# Patient Record
Sex: FEMALE | Race: WHITE | NOT HISPANIC OR LATINO | Employment: OTHER | URBAN - NONMETROPOLITAN AREA
[De-identification: names, ages, dates, MRNs, and addresses within clinical notes are randomized per-mention and may not be internally consistent; named-entity substitution may affect disease eponyms.]

---

## 2017-03-22 ENCOUNTER — OFFICE VISIT (OUTPATIENT)
Dept: CARDIOLOGY | Facility: CLINIC | Age: 72
End: 2017-03-22

## 2017-03-22 VITALS
DIASTOLIC BLOOD PRESSURE: 73 MMHG | OXYGEN SATURATION: 98 % | HEART RATE: 85 BPM | HEIGHT: 64 IN | SYSTOLIC BLOOD PRESSURE: 123 MMHG | WEIGHT: 146 LBS | BODY MASS INDEX: 24.92 KG/M2

## 2017-03-22 DIAGNOSIS — R07.89 OTHER CHEST PAIN: Primary | ICD-10-CM

## 2017-03-22 DIAGNOSIS — R06.02 SHORTNESS OF BREATH: ICD-10-CM

## 2017-03-22 DIAGNOSIS — R42 DIZZINESS: ICD-10-CM

## 2017-03-22 DIAGNOSIS — R00.2 PALPITATIONS: ICD-10-CM

## 2017-03-22 DIAGNOSIS — R53.82 CHRONIC FATIGUE: ICD-10-CM

## 2017-03-22 DIAGNOSIS — M79.10 MYALGIA: ICD-10-CM

## 2017-03-22 PROBLEM — R07.9 CHEST PAIN: Status: ACTIVE | Noted: 2017-03-22

## 2017-03-22 PROCEDURE — 99204 OFFICE O/P NEW MOD 45 MIN: CPT | Performed by: INTERNAL MEDICINE

## 2017-03-22 PROCEDURE — 93000 ELECTROCARDIOGRAM COMPLETE: CPT | Performed by: INTERNAL MEDICINE

## 2017-03-22 RX ORDER — METHOCARBAMOL 750 MG/1
TABLET ORAL WEEKLY
COMMUNITY
Start: 2017-03-01 | End: 2019-09-25

## 2017-03-22 RX ORDER — BETAMETHASONE DIPROPIONATE 0.5 MG/G
LOTION TOPICAL 2 TIMES DAILY
COMMUNITY
Start: 2017-03-19 | End: 2022-03-10

## 2017-03-22 RX ORDER — BIOTIN 10 MG
TABLET ORAL DAILY
COMMUNITY
End: 2019-09-25

## 2017-03-22 RX ORDER — FINASTERIDE 5 MG/1
5 TABLET, FILM COATED ORAL 2 TIMES DAILY
COMMUNITY
End: 2019-09-25

## 2017-03-22 RX ORDER — TRIAMCINOLONE ACETONIDE 1 MG/G
CREAM TOPICAL 2 TIMES DAILY
COMMUNITY
Start: 2017-03-09 | End: 2022-03-10

## 2017-03-22 RX ORDER — MONTELUKAST SODIUM 10 MG/1
TABLET ORAL DAILY
COMMUNITY
Start: 2017-03-09 | End: 2019-09-25

## 2017-04-10 ENCOUNTER — APPOINTMENT (OUTPATIENT)
Dept: CARDIOLOGY | Facility: HOSPITAL | Age: 72
End: 2017-04-10
Attending: INTERNAL MEDICINE

## 2017-04-20 ENCOUNTER — HOSPITAL ENCOUNTER (OUTPATIENT)
Dept: CARDIOLOGY | Facility: HOSPITAL | Age: 72
Discharge: HOME | End: 2017-04-20
Attending: INTERNAL MEDICINE

## 2017-04-20 ENCOUNTER — OUTSIDE FACILITY SERVICE (OUTPATIENT)
Dept: CARDIOLOGY | Facility: CLINIC | Age: 72
End: 2017-04-20

## 2017-04-20 LAB
MAXIMAL PREDICTED HEART RATE: 150 BPM
STRESS TARGET HR: 128 BPM

## 2017-04-20 PROCEDURE — 0 TECHNETIUM SESTAMIBI: Performed by: INTERNAL MEDICINE

## 2017-04-20 PROCEDURE — 78452 HT MUSCLE IMAGE SPECT MULT: CPT | Performed by: INTERNAL MEDICINE

## 2017-04-20 PROCEDURE — A9500 TC99M SESTAMIBI: HCPCS | Performed by: INTERNAL MEDICINE

## 2017-04-20 PROCEDURE — 93017 CV STRESS TEST TRACING ONLY: CPT

## 2017-04-20 PROCEDURE — 78452 HT MUSCLE IMAGE SPECT MULT: CPT

## 2017-04-20 PROCEDURE — 93018 CV STRESS TEST I&R ONLY: CPT | Performed by: INTERNAL MEDICINE

## 2017-04-20 RX ADMIN — Medication 1 DOSE: at 14:30

## 2019-09-25 ENCOUNTER — OFFICE VISIT (OUTPATIENT)
Dept: CARDIOLOGY | Facility: CLINIC | Age: 74
End: 2019-09-25

## 2019-09-25 VITALS
WEIGHT: 146 LBS | BODY MASS INDEX: 24.92 KG/M2 | OXYGEN SATURATION: 97 % | SYSTOLIC BLOOD PRESSURE: 132 MMHG | HEIGHT: 64 IN | HEART RATE: 71 BPM | DIASTOLIC BLOOD PRESSURE: 69 MMHG

## 2019-09-25 DIAGNOSIS — R07.2 PRECORDIAL PAIN: ICD-10-CM

## 2019-09-25 DIAGNOSIS — R00.2 PALPITATIONS: Primary | ICD-10-CM

## 2019-09-25 DIAGNOSIS — R06.02 SHORTNESS OF BREATH: ICD-10-CM

## 2019-09-25 DIAGNOSIS — R53.82 CHRONIC FATIGUE: ICD-10-CM

## 2019-09-25 DIAGNOSIS — R42 DIZZINESS: ICD-10-CM

## 2019-09-25 PROCEDURE — 99214 OFFICE O/P EST MOD 30 MIN: CPT | Performed by: INTERNAL MEDICINE

## 2019-09-25 RX ORDER — ATENOLOL 25 MG/1
25 TABLET ORAL DAILY
Qty: 30 TABLET | Refills: 11 | Status: SHIPPED | OUTPATIENT
Start: 2019-09-25 | End: 2019-12-02

## 2019-09-25 NOTE — PROGRESS NOTES
"Subjective   Racquel Cheema is a 73 y.o. female     Chief Complaint   Patient presents with   • Palpitations     Here for a follow up    • Shortness of Breath       PROBLEM LIST:     1. Hyperlipidemia  2. Chest Pain  3. Shortness of breath  4. Fatigue  5. Dizziness  6. Palpitations  7. Lung CA  8. Borderline Diabetes  9. GERD  10. IBS  11. Diverticulosis    HPI: Patient is a 73-year-old female patient that recently underwent cataract surgery and presented to the office due to a fuzzy and thick feeling in the back of her head.  This occurred while she was in preop prior to any medications and was identified to be in a sinus bradycardia at 46 with a single PVC and a blood pressure of 142/57.  Ports that these episodes are occurring more frequently than usual.  Also episodes are brought on by deep breathing and holding her breath during assessment.  Additional complaints include fatigue with decreased exercise intolerance due to difficulty in breathing.  \"Feels like it is hard to take a deep breath \".  Patient also reports having chest pains approximately 4-5 times a week over the past few months the chest pain is periodically gotten more frequent over the year and is unable to describe other than pain no aggravating or relieving factors were available to be identified.  Other associated factors include nausea and diaphoresis and palpitations.  So shaded symptoms did not occur at the same time his chest pain.  Patient reports waking up at night and also during the day and having episodes of severe diaphoresis and severe nausea.  Ports palpitations that feel like her heart is Jell-O.  Patient denies any PND, daryl orthopnea, lower extremity edema.  Additional symptom that is reported as nighttime sweats that are not associated with chest pain.                                                       Specialty Problems        Cardiology Problems    Palpitations                                        CURRENT " "MEDICATION:    Current Outpatient Medications   Medication Sig Dispense Refill   • Ascorbic Acid (VITAMIN C PO) Take 1,000 mg by mouth Daily.     • betamethasone dipropionate (DIPROLENE) 0.05 % lotion 2 (Two) Times a Day.     • Esomeprazole Magnesium (NEXIUM PO) Take  by mouth Daily As Needed.     • LUTEIN-ZEAXANTHIN PO Take  by mouth Daily.     • triamcinolone (KENALOG) 0.1 % cream 2 (Two) Times a Day.       No current facility-administered medications for this visit.        ALLERGIES:    Demerol [meperidine]    PAST MEDICAL HISTORY:    Past Medical History:   Diagnosis Date   • Borderline diabetes    • Cancer (CMS/HCC)     lung cancer   • Diverticulosis    • Dizziness    • Eczema    • GERD (gastroesophageal reflux disease)    • Hiatal hernia    • Hyperlipidemia    • IBS (irritable bowel syndrome)    • Rosacea        SURGICAL HISTORY:    Past Surgical History:   Procedure Laterality Date   • BILATERAL BREAST REDUCTION     • BUNIONECTOMY     • HYSTERECTOMY     • LUNG LOBECTOMY     • TONSILLECTOMY         SOCIAL HISTORY:    Social History     Socioeconomic History   • Marital status:      Spouse name: Not on file   • Number of children: Not on file   • Years of education: Not on file   • Highest education level: Not on file   Tobacco Use   • Smoking status: Never Smoker   • Smokeless tobacco: Never Used   Substance and Sexual Activity   • Alcohol use: No   • Drug use: No       FAMILY HISTORY:    Family History   Problem Relation Age of Onset   • Heart attack Mother    • Alzheimer's disease Mother    • Heart attack Father    • Heart attack Brother        Review of Systems   Constitutional: Positive for activity change (Decrease exercise intolerance ) and fatigue (no energy).   HENT: Negative.    Eyes: Negative.    Respiratory: Positive for shortness of breath (\"Hard to get a breath\").         Denies orthopnea/PND   Cardiovascular: Positive for chest pain (sharp, no precipitating factors. denies shortness of " "breath) and palpitations (Feels like my heart is Jello). Negative for leg swelling.   Gastrointestinal: Negative.    Endocrine: Negative.    Genitourinary: Negative.    Musculoskeletal: Positive for arthralgias and myalgias.   Skin: Negative.    Allergic/Immunologic: Negative.    Neurological: Positive for dizziness (Fuzzy thick feeling in the back of the head). Negative for syncope.   Hematological: Negative.    Psychiatric/Behavioral: Negative.        VISIT VITALS:  Vitals:    09/25/19 1021 09/25/19 1118   BP: 119/75 132/69   BP Location: Left arm Left arm   Patient Position: Sitting Sitting   Pulse: 71    SpO2: 97%    Weight: 66.2 kg (146 lb)    Height: 162.6 cm (64\")       /69 (BP Location: Left arm, Patient Position: Sitting)   Pulse 71   Ht 162.6 cm (64\")   Wt 66.2 kg (146 lb)   SpO2 97%   BMI 25.06 kg/m²     RECENT LABS:    Objective       Physical Exam   Constitutional: She is oriented to person, place, and time. Vital signs are normal. She appears well-developed and well-nourished. No distress.   HENT:   Head: Normocephalic and atraumatic.   Eyes: Conjunctivae, EOM and lids are normal. Pupils are equal, round, and reactive to light.   Neck: Trachea normal and normal range of motion. Neck supple. No JVD present. Carotid bruit is not present. No tracheal deviation present.   Nl. Carotid upstrokes     Cardiovascular: Normal rate, regular rhythm, normal heart sounds and intact distal pulses.   Pulses:       Dorsalis pedis pulses are 2+ on the right side, and 2+ on the left side.        Posterior tibial pulses are 2+ on the right side, and 2+ on the left side.   Pulmonary/Chest: Effort normal and breath sounds normal. No respiratory distress. She has no wheezes. She has no rhonchi. She has no rales.   Nl. Expir. Phase  Nl. Breath sound intensity     Abdominal: Soft. Normal appearance and bowel sounds are normal. She exhibits no distension, no abdominal bruit and no mass. There is no tenderness. There " is no rebound and no guarding.   No organomegaly   Musculoskeletal: Normal range of motion. She exhibits no edema, tenderness or deformity.   Neurological: She is alert and oriented to person, place, and time. She has normal strength. No cranial nerve deficit.   Skin: Skin is warm, dry and intact. No rash noted. No erythema. No pallor.   Psychiatric: She has a normal mood and affect. Her speech is normal and behavior is normal. Judgment and thought content normal. Cognition and memory are normal.   Nursing note and vitals reviewed.      Procedures      Assessment/Plan      Diagnosis Plan   1. Palpitations     2. Shortness of breath     3. Precordial pain     4. Myalgia     5. Chronic fatigue     6. Dizziness         No Follow-up on file.     1 palpitations: Patient will be ordered an event monitor to help evaluate for particular dysrhythmias and help determine the pathology of the palpitations and dizziness.  2 precordial pain/shortness of breath/chronic fatigue/dizziness will be evaluated by transesophageal echocardiogram, stress test with myocardial perfusion, a cardiac event monitor.  Patient informed to call office or go to the emergency room for any worsening symptoms or any new symptoms that are concerning.  3.  Patiently currently on atenolol 25 mg daily and will continue therapy until further testing is resulted.  No other change in medication regimen will be made at this time.    Patient will follow-up with Dr. Donald her testing is completed or sooner if new or exacerbation of symptoms.         Patient's Body mass index is 25.06 kg/m². BMI is within normal parameters. No follow-up required..  RODNEY Armenta seen patient with Dr. Donald and served as a scribe    Jane Hargrove LPN    Scribed for Dr. Timmy Donald by Jane Hargrove LPN September 25, 2019 11:27 AM         Electronically signed by:            This note is dictated utilizing voice recognition software.  Although this record has been  proof read, transcriptional errors may still be present. If questions occur regarding the content of this record please do not hesitate to call our office.

## 2019-10-14 ENCOUNTER — HOSPITAL ENCOUNTER (OUTPATIENT)
Dept: CARDIOLOGY | Facility: HOSPITAL | Age: 74
Discharge: HOME | End: 2019-10-14

## 2019-10-14 DIAGNOSIS — R06.02 SHORTNESS OF BREATH: ICD-10-CM

## 2019-10-14 DIAGNOSIS — R00.2 PALPITATIONS: ICD-10-CM

## 2019-10-14 DIAGNOSIS — R53.82 CHRONIC FATIGUE: ICD-10-CM

## 2019-10-14 DIAGNOSIS — R07.2 PRECORDIAL PAIN: ICD-10-CM

## 2019-10-14 DIAGNOSIS — R42 DIZZINESS: ICD-10-CM

## 2019-10-14 PROCEDURE — 93306 TTE W/DOPPLER COMPLETE: CPT | Performed by: INTERNAL MEDICINE

## 2019-10-14 PROCEDURE — 93306 TTE W/DOPPLER COMPLETE: CPT

## 2019-10-14 PROCEDURE — 78452 HT MUSCLE IMAGE SPECT MULT: CPT | Performed by: INTERNAL MEDICINE

## 2019-10-14 PROCEDURE — 25010000002 REGADENOSON 0.4 MG/5ML SOLUTION: Performed by: INTERNAL MEDICINE

## 2019-10-14 PROCEDURE — 0 TECHNETIUM SESTAMIBI: Performed by: INTERNAL MEDICINE

## 2019-10-14 PROCEDURE — A9500 TC99M SESTAMIBI: HCPCS | Performed by: INTERNAL MEDICINE

## 2019-10-14 PROCEDURE — 93018 CV STRESS TEST I&R ONLY: CPT | Performed by: INTERNAL MEDICINE

## 2019-10-14 PROCEDURE — 78452 HT MUSCLE IMAGE SPECT MULT: CPT

## 2019-10-14 PROCEDURE — 93017 CV STRESS TEST TRACING ONLY: CPT

## 2019-10-14 RX ADMIN — TECHNETIUM TC 99M SESTAMIBI 1 DOSE: 1 INJECTION INTRAVENOUS at 08:22

## 2019-10-14 RX ADMIN — REGADENOSON 0.4 MG: 0.08 INJECTION, SOLUTION INTRAVENOUS at 08:22

## 2019-10-17 LAB
BH CV STRESS COMMENTS STAGE 1: NORMAL
BH CV STRESS DOSE REGADENOSON STAGE 1: 0.4
BH CV STRESS DURATION MIN STAGE 1: 0
BH CV STRESS DURATION SEC STAGE 1: 10
BH CV STRESS PROTOCOL 1: NORMAL
BH CV STRESS RECOVERY BP: NORMAL MMHG
BH CV STRESS RECOVERY HR: 86 BPM
BH CV STRESS STAGE 1: 1
MAXIMAL PREDICTED HEART RATE: 148 BPM
PERCENT MAX PREDICTED HR: 69.59 %
STRESS BASELINE BP: NORMAL MMHG
STRESS BASELINE HR: 61 BPM
STRESS PERCENT HR: 82 %
STRESS POST PEAK BP: NORMAL MMHG
STRESS POST PEAK HR: 103 BPM
STRESS TARGET HR: 126 BPM

## 2019-10-21 ENCOUNTER — TELEPHONE (OUTPATIENT)
Dept: CARDIOLOGY | Facility: CLINIC | Age: 74
End: 2019-10-21

## 2019-10-21 NOTE — TELEPHONE ENCOUNTER
No answer at home number. L/M would try contacting her again on Monday. 10-18-19 12:34 am. PH,LPN    SPOKE WITH ROSALINA HIS AM, AWARE STRESS TEST NEG. FOR ISCHEMIA AND KEEP F/U APPT. ON 12-2-19. ELYSSA SAUCEDO        ----- Message from Timmy Donald MD sent at 10/18/2019 12:28 PM EDT -----  Routine f/u.

## 2019-10-23 ENCOUNTER — TELEPHONE (OUTPATIENT)
Dept: CARDIOLOGY | Facility: CLINIC | Age: 74
End: 2019-10-23

## 2019-10-23 LAB
BH CV ECHO MEAS - ACS: 1.9 CM
BH CV ECHO MEAS - AO MAX PG: 6 MMHG
BH CV ECHO MEAS - AO MEAN PG: 4 MMHG
BH CV ECHO MEAS - AO ROOT AREA (BSA CORRECTED): 2
BH CV ECHO MEAS - AO ROOT AREA: 9.3 CM^2
BH CV ECHO MEAS - AO ROOT DIAM: 3.5 CM
BH CV ECHO MEAS - AO V2 MAX: 122 CM/SEC
BH CV ECHO MEAS - AO V2 MEAN: 89.6 CM/SEC
BH CV ECHO MEAS - AO V2 VTI: 32.8 CM
BH CV ECHO MEAS - BSA(HAYCOCK): 1.7 M^2
BH CV ECHO MEAS - BSA: 1.7 M^2
BH CV ECHO MEAS - BZI_BMI: 25.1 KILOGRAMS/M^2
BH CV ECHO MEAS - BZI_METRIC_HEIGHT: 162.6 CM
BH CV ECHO MEAS - BZI_METRIC_WEIGHT: 66.2 KG
BH CV ECHO MEAS - EDV(CUBED): 35.9 ML
BH CV ECHO MEAS - EDV(MOD-SP4): 67.3 ML
BH CV ECHO MEAS - EDV(TEICH): 44.1 ML
BH CV ECHO MEAS - EF(CUBED): 72.7 %
BH CV ECHO MEAS - EF(MOD-SP4): 60.9 %
BH CV ECHO MEAS - EF(TEICH): 65.8 %
BH CV ECHO MEAS - ESV(CUBED): 9.8 ML
BH CV ECHO MEAS - ESV(MOD-SP4): 26.3 ML
BH CV ECHO MEAS - ESV(TEICH): 15.1 ML
BH CV ECHO MEAS - FS: 35.2 %
BH CV ECHO MEAS - IVS/LVPW: 0.94
BH CV ECHO MEAS - IVSD: 0.95 CM
BH CV ECHO MEAS - LA DIMENSION: 2.8 CM
BH CV ECHO MEAS - LA/AO: 0.81
BH CV ECHO MEAS - LV DIASTOLIC VOL/BSA (35-75): 39.3 ML/M^2
BH CV ECHO MEAS - LV IVRT: 0.09 SEC
BH CV ECHO MEAS - LV MASS(C)D: 91.6 GRAMS
BH CV ECHO MEAS - LV MASS(C)DI: 53.6 GRAMS/M^2
BH CV ECHO MEAS - LV SYSTOLIC VOL/BSA (12-30): 15.4 ML/M^2
BH CV ECHO MEAS - LVIDD: 3.3 CM
BH CV ECHO MEAS - LVIDS: 2.1 CM
BH CV ECHO MEAS - LVLD AP4: 6.8 CM
BH CV ECHO MEAS - LVLS AP4: 5.8 CM
BH CV ECHO MEAS - LVOT AREA (M): 3.5 CM^2
BH CV ECHO MEAS - LVOT AREA: 3.5 CM^2
BH CV ECHO MEAS - LVOT DIAM: 2.1 CM
BH CV ECHO MEAS - LVPWD: 1 CM
BH CV ECHO MEAS - MV A MAX VEL: 94.3 CM/SEC
BH CV ECHO MEAS - MV DEC SLOPE: 194 CM/SEC^2
BH CV ECHO MEAS - MV E MAX VEL: 63.7 CM/SEC
BH CV ECHO MEAS - MV E/A: 0.68
BH CV ECHO MEAS - RVDD: 2.7 CM
BH CV ECHO MEAS - SI(AO): 179.2 ML/M^2
BH CV ECHO MEAS - SI(CUBED): 15.3 ML/M^2
BH CV ECHO MEAS - SI(MOD-SP4): 24 ML/M^2
BH CV ECHO MEAS - SI(TEICH): 17 ML/M^2
BH CV ECHO MEAS - SV(AO): 306.6 ML
BH CV ECHO MEAS - SV(CUBED): 26.1 ML
BH CV ECHO MEAS - SV(MOD-SP4): 41 ML
BH CV ECHO MEAS - SV(TEICH): 29 ML
MAXIMAL PREDICTED HEART RATE: 148 BPM
STRESS TARGET HR: 126 BPM

## 2019-10-23 NOTE — TELEPHONE ENCOUNTER
ECHO RESULTS BRIEFLY DISCUSSED WITH PATIENT AND TO KEEP F/U APPT. SCHEDULED. PH,LPN          ----- Message from Timmy Donald MD sent at 10/23/2019  3:25 PM EDT -----  Routine f/u.

## 2019-10-24 ENCOUNTER — OUTSIDE FACILITY SERVICE (OUTPATIENT)
Dept: CARDIOLOGY | Facility: CLINIC | Age: 74
End: 2019-10-24

## 2019-10-24 PROCEDURE — 93228 REMOTE 30 DAY ECG REV/REPORT: CPT | Performed by: INTERNAL MEDICINE

## 2019-11-05 ENCOUNTER — DOCUMENTATION (OUTPATIENT)
Dept: CARDIOLOGY | Facility: CLINIC | Age: 74
End: 2019-11-05

## 2019-11-05 NOTE — PROGRESS NOTES
INCORRECT PROCESS FOLLOWED WITH DOCUMENTATION ORIGINALLY COMPLETED FOR 9/25/19 VISIT.  RODNEY WALLER ACTED AS SCRIBE FOR DR. LUO.

## 2019-12-02 ENCOUNTER — OFFICE VISIT (OUTPATIENT)
Dept: CARDIOLOGY | Facility: CLINIC | Age: 74
End: 2019-12-02

## 2019-12-02 VITALS
SYSTOLIC BLOOD PRESSURE: 130 MMHG | DIASTOLIC BLOOD PRESSURE: 82 MMHG | OXYGEN SATURATION: 96 % | WEIGHT: 149 LBS | HEART RATE: 74 BPM | HEIGHT: 64 IN | BODY MASS INDEX: 25.44 KG/M2

## 2019-12-02 DIAGNOSIS — R00.2 PALPITATIONS: ICD-10-CM

## 2019-12-02 DIAGNOSIS — R53.82 CHRONIC FATIGUE: ICD-10-CM

## 2019-12-02 DIAGNOSIS — R42 DIZZINESS: ICD-10-CM

## 2019-12-02 DIAGNOSIS — R06.02 SHORTNESS OF BREATH: Primary | ICD-10-CM

## 2019-12-02 PROCEDURE — 99214 OFFICE O/P EST MOD 30 MIN: CPT | Performed by: NURSE PRACTITIONER

## 2019-12-02 RX ORDER — PANTOPRAZOLE SODIUM 40 MG/1
40 TABLET, DELAYED RELEASE ORAL DAILY
Refills: 5 | COMMUNITY
Start: 2019-10-17

## 2019-12-02 NOTE — PROGRESS NOTES
Subjective     Racquel Cheema is a 73 y.o. female who presents to day for Follow-up (Here for a follow up on testing ).    CHIEF COMPLIANT  Chief Complaint   Patient presents with   • Follow-up     Here for a follow up on testing        Active Problems:  Problem List Items Addressed This Visit        Cardiovascular and Mediastinum    Palpitations       Respiratory    Shortness of breath - Primary    Relevant Orders    XR Chest PA & Lateral    Full Pulmonary Function Test With Bronchodilator       Other    Chronic fatigue    Dizziness      PROBLEM LIST:      1. Hyperlipidemia  2. Chest Pain  2.1 9/19 negative stress test with preserved ejection fraction  2.2 9/19 echocardiogram with mild left ventricular hypertrophy and diastolic dysfunction stage I ejection fraction of 56 to 60%  3. Shortness of breath  4. Fatigue  5. Dizziness  6. Palpitations  7. Lung CA  8. Borderline Diabetes  9. GERD  10. IBS  11. Diverticulosis    HPI  HPI  Mrs. Cheema is a 73-year-old female who is being seen today for follow-up for stress and echo results.  The stress test revealed no sign to graphic evidence of ischemia, preserved post stress ejection fraction of 74% with no focal wall abnormality.  The echocardiogram identified mild concentric left ventricular hypertrophy with grade 1 diastolic dysfunction, trivial to mild MR, heavily calcified aortic valve without stenosis or insufficiency, and mild pulmonic insufficiency.  We will pericardial effusion present which is likely physiological.  Patient had 2 runs of atrial tachycardia in her event monitor over the 14-day.  Most symptoms occurred in a sinus rhythm to sinus tach.  Also reports that she has had a negative carotid, negative CT of the head, negative MRI of the neck with mild disease by her primary care provider recently.  States that the dizziness occurs at random anytime throughout the day no other symptoms or warnings are associated with it.  Patient states that she has been  having a lot of neck trouble and had mild disease that was identified on MRI but she ever since she has had a headache that do not stop and gets frequent kinks in her neck.  Does state that she is undergoing physical therapy for her neck.  Does not seem to be helping any.  Also reported seeing GI and they switched her PPI which decreased a lot of her GERD-like symptoms including her cough.  Still complains of fatigue in which she is always tired.  A decreased exercise tolerance was demonstrated by the report of unable to walk up the driveway without having to stop and rest and gasp for air.  Patient denies any chest pain, chest pressure, chest tightness, PND, orthopnea, lower extremity edema, syncope, or other neurological changes.  PRIOR MEDS  Current Outpatient Medications on File Prior to Visit   Medication Sig Dispense Refill   • Ascorbic Acid (VITAMIN C PO) Take 1,000 mg by mouth Daily.     • betamethasone dipropionate (DIPROLENE) 0.05 % lotion 2 (Two) Times a Day.     • Calcium Carbonate-Vitamin D (CALTRATE 600+D PO) Take 1 tablet by mouth Daily.     • pantoprazole (PROTONIX) 40 MG EC tablet Take 40 mg by mouth Daily. FOR 30 DAYS  5   • triamcinolone (KENALOG) 0.1 % cream 2 (Two) Times a Day.     • Esomeprazole Magnesium (NEXIUM PO) Take  by mouth Daily As Needed.     • [DISCONTINUED] atenolol (TENORMIN) 25 MG tablet Take 1 tablet by mouth Daily. 30 tablet 11   • [DISCONTINUED] LUTEIN-ZEAXANTHIN PO Take  by mouth Daily.       No current facility-administered medications on file prior to visit.        ALLERGIES  Demerol [meperidine]    HISTORY  Past Medical History:   Diagnosis Date   • Borderline diabetes    • Cancer (CMS/HCC)     lung cancer   • Diverticulosis    • Dizziness    • Eczema    • GERD (gastroesophageal reflux disease)    • Hiatal hernia    • Hyperlipidemia    • IBS (irritable bowel syndrome)    • Rosacea        Social History     Socioeconomic History   • Marital status:      Spouse name:  "Not on file   • Number of children: Not on file   • Years of education: Not on file   • Highest education level: Not on file   Tobacco Use   • Smoking status: Never Smoker   • Smokeless tobacco: Never Used   Substance and Sexual Activity   • Alcohol use: No   • Drug use: No       Family History   Problem Relation Age of Onset   • Heart attack Mother    • Alzheimer's disease Mother    • Heart attack Father    • Heart attack Brother        Review of Systems   Constitutional: Positive for fatigue (always tired ). Negative for chills and fever.   HENT: Negative.    Eyes: Negative.  Negative for visual disturbance.   Respiratory: Positive for cough (dry cough at times ) and shortness of breath (SOA with exertion , has to catch breath, can't walk up drive way). Negative for chest tightness.    Cardiovascular: Positive for palpitations (occasional flutters ). Negative for chest pain and leg swelling.   Gastrointestinal: Negative.    Endocrine: Positive for cold intolerance. Negative for heat intolerance.   Genitourinary: Negative.    Musculoskeletal: Positive for neck pain. Negative for arthralgias and back pain.   Skin: Positive for rash (chronic eczema, rosacea on her face ). Negative for wound.   Allergic/Immunologic: Negative.  Negative for environmental allergies and food allergies.   Neurological: Positive for dizziness and light-headedness (occasional ). Negative for weakness.   Hematological: Negative.  Does not bruise/bleed easily.   Psychiatric/Behavioral: Negative.  Negative for sleep disturbance (denies waking with smothering ).       Objective     VITALS: /82 (BP Location: Left arm, Patient Position: Sitting)   Pulse 74   Ht 162.6 cm (64\")   Wt 67.6 kg (149 lb)   SpO2 96%   BMI 25.58 kg/m²     LABS:   Lab Results (most recent)     None          IMAGING:   No Images in the past 120 days found..    EXAM:  Physical Exam   Constitutional: She is oriented to person, place, and time. Vital signs are " normal. She appears well-developed and well-nourished.   Eyes: EOM are normal. Pupils are equal, round, and reactive to light.   Neck: Trachea normal and phonation normal. Neck supple. No JVD present. Carotid bruit is not present. No thyroid mass present.   Cardiovascular: Normal rate, regular rhythm, normal heart sounds and intact distal pulses. Exam reveals no gallop and no friction rub.   No murmur heard.  Pulses:       Radial pulses are 2+ on the right side, and 2+ on the left side.        Posterior tibial pulses are 2+ on the right side, and 2+ on the left side.   Pulmonary/Chest: Effort normal and breath sounds normal. No respiratory distress. She has no wheezes. She has no rales.   Abdominal: Soft. Bowel sounds are normal. She exhibits no distension and no abdominal bruit. There is no tenderness.   Musculoskeletal: Normal range of motion.   Neurological: She is alert and oriented to person, place, and time. She has normal strength. No cranial nerve deficit or sensory deficit.   Skin: Skin is warm, dry and intact. Capillary refill takes less than 2 seconds. No rash noted.   Psychiatric: She has a normal mood and affect. Her speech is normal and behavior is normal. Judgment and thought content normal. Cognition and memory are normal.   Nursing note and vitals reviewed.      Procedure   Procedures       Assessment/Plan    Diagnosis Plan   1. Shortness of breath  XR Chest PA & Lateral    Full Pulmonary Function Test With Bronchodilator    Full Pulmonary Function Test With Bronchodilator    XR Chest PA & Lateral   2. Palpitations     3. Chronic fatigue     4. Dizziness     1.  Due to patient's persistent shortness of air and negative previous studies I feel is appropriate to get a chest x-ray to further evaluate pulmonary causes.  We will also order outpatient PFTs with bronchodilator therapy to check lung capacity.  Discussed referral to pulmonology but requested to discuss with Dr. Kathrine driver.  2.   Patient's palpitations are infrequent and describes as fluttering.  Discuss metoprolol therapy due to runs of atrial tachycardia on her event monitor.  Patient wishes to defer at this time and also further discussed with Dr. Leslie.  3.  Patient advised to exercise but not to overexert.  Advised may increase her conditioning and help decrease some of the shortness of air.  4.  Patient has persistent dizziness and has had negative carotid Doppler, negative CT of the head, so we will look for potential pulmonary cause.  Also advised to monitor blood pressure at times of dizziness to see if blood pressures actually low or high.  5.  Patient informed of signs and symptoms of ACS and advised to seek emergent treatment for any new or worsening symptoms.  Patient also advised to seek sooner follow-up as needed.    Return in about 4 weeks (around 12/30/2019), or if symptoms worsen or fail to improve.    Racquel was seen today for follow-up.    Diagnoses and all orders for this visit:    Shortness of breath  -     XR Chest PA & Lateral; Future  -     Full Pulmonary Function Test With Bronchodilator; Future  -     Full Pulmonary Function Test With Bronchodilator  -     XR Chest PA & Lateral    Palpitations    Chronic fatigue    Dizziness          Patient's Body mass index is 25.58 kg/m². BMI is within normal parameters. No follow-up required..           MEDS ORDERED DURING VISIT:  No orders of the defined types were placed in this encounter.          This document has been electronically signed by RODNEY Armenta Jr.  December 2, 2019 12:46 PM

## 2019-12-02 NOTE — PATIENT INSTRUCTIONS
Dizziness  Dizziness is a common problem. It makes you feel unsteady or light-headed. You may feel like you are about to pass out (faint). Dizziness can lead to getting hurt if you stumble or fall. Dizziness can be caused by many things, including:  · Medicines.  · Not having enough water in your body (dehydration).  · Illness.  Follow these instructions at home:  Eating and drinking    · Drink enough fluid to keep your pee (urine) clear or pale yellow. This helps to keep you from getting dehydrated. Try to drink more clear fluids, such as water.  · Do not drink alcohol.  · Limit how much caffeine you drink or eat, if your doctor tells you to do that.  · Limit how much salt (sodium) you drink or eat, if your doctor tells you to do that.  Activity    · Avoid making quick movements.  ? When you stand up from sitting in a chair, steady yourself until you feel okay.  ? In the morning, first sit up on the side of the bed. When you feel okay, stand slowly while you hold onto something. Do this until you know that your balance is fine.  · If you need to  one place for a long time, move your legs often. Tighten and relax the muscles in your legs while you are standing.  · Do not drive or use heavy machinery if you feel dizzy.  · Avoid bending down if you feel dizzy. Place items in your home so you can reach them easily without leaning over.  Lifestyle  · Do not use any products that contain nicotine or tobacco, such as cigarettes and e-cigarettes. If you need help quitting, ask your doctor.  · Try to lower your stress level. You can do this by using methods such as yoga or meditation. Talk with your doctor if you need help.  General instructions  · Watch your dizziness for any changes.  · Take over-the-counter and prescription medicines only as told by your doctor. Talk with your doctor if you think that you are dizzy because of a medicine that you are taking.  · Tell a friend or a family member that you are feeling  dizzy. If he or she notices any changes in your behavior, have this person call your doctor.  · Keep all follow-up visits as told by your doctor. This is important.  Contact a doctor if:  · Your dizziness does not go away.  · Your dizziness or light-headedness gets worse.  · You feel sick to your stomach (nauseous).  · You have trouble hearing.  · You have new symptoms.  · You are unsteady on your feet.  · You feel like the room is spinning.  Get help right away if:  · You throw up (vomit) or have watery poop (diarrhea), and you cannot eat or drink anything.  · You have trouble:  ? Talking.  ? Walking.  ? Swallowing.  ? Using your arms, hands, or legs.  · You feel generally weak.  · You are not thinking clearly, or you have trouble forming sentences. A friend or family member may notice this.  · You have:  ? Chest pain.  ? Pain in your belly (abdomen).  ? Shortness of breath.  ? Sweating.  · Your vision changes.  · You are bleeding.  · You have a very bad headache.  · You have neck pain or a stiff neck.  · You have a fever.  These symptoms may be an emergency. Do not wait to see if the symptoms will go away. Get medical help right away. Call your local emergency services (911 in the [place] Do not drive yourself to the hospital.  Summary  · Dizziness makes you feel unsteady or light-headed. You may feel like you are about to pass out (faint).  · Drink enough fluid to keep your pee (urine) clear or pale yellow. Do not drink alcohol.  · Avoid making quick movements if you feel dizzy.  · Watch your dizziness for any changes.  This information is not intended to replace advice given to you by your health care provider. Make sure you discuss any questions you have with your health care provider.  Document Released: 12/06/2012 Document Revised: 01/04/2018 Document Reviewed: 01/04/2018  [employer] Interactive Patient Education © 2019 [employer]  Acute Coronary Syndrome    Acute coronary syndrome (ACS) is a serious problem  in which there is suddenly not enough blood and oxygen reaching the heart. ACS can result in chest pain or a heart attack.  This condition is a medical emergency. If you have any symptoms of this condition, get help right away.  What are the causes?  This condition may be caused by:  · Buildup of fat and cholesterol inside of the arteries (atherosclerosis). This is the most common cause. The buildup (plaque) can cause blood vessels in the heart (coronary arteries) to become narrow or blocked, which reduces blood flow to the heart. Plaque can also break off and lead to a clot, which can block an artery and cause a heart attack or stroke.  · Sudden tightening of the muscles around the coronary arteries (coronary spasm).  · Tearing of a coronary artery (spontaneous coronary artery dissection).  · Very low blood pressure (hypotension).  · An abnormal heartbeat (arrhythmia).  · Other medical conditions that cause a decrease of oxygen to the heart, such as anemiaorrespiratory failure.  · Using cocaine or methamphetamine.  What increases the risk?  The following factors may make you more likely to develop this condition:  · Age. The risk for ACS increases as you get older.  · History of chest pain, heart attack, peripheral artery disease, or stroke.  · Having taken chemotherapy or immune-suppressing medicines.  · Being male.  · Family history of chest pain, heart disease, or stroke.  · Smoking.  · Not exercising enough.  · Being overweight.  · High cholesterol.  · High blood pressure (hypertension).  · Diabetes.  · Excessive alcohol use.  What are the signs or symptoms?  Common symptoms of this condition include:  · Chest pain. The pain may last a long time, or it may stop and come back (recur). It may feel like:  ? Crushing or squeezing.  ? Tightness, pressure, fullness, or heaviness.  · Arm, neck, jaw, or back pain.  · Heartburn or indigestion.  · Shortness of breath.  · Nausea.  · Sudden cold  sweats.  · Light-headedness.  · Dizziness, or passing out.  · Tiredness (fatigue).  Sometimes there are no symptoms.  How is this diagnosed?  This condition may be diagnosed based on:  · Your medical history and symptoms.  · An electrocardiogram (ECG). This imaging test measures the heart's electrical activity.  · Blood tests. Cardiac blood tests may need to be repeated at designated time intervals.  · Chest X-ray.  · A CT scan of the chest.  · A coronary angiogram. This is a procedure in which dye is injected into the bloodstream and then X-rays are taken to show if there is a blockage in a coronary artery.  · Exercise stress testing.  · Echocardiography. This is a test that uses sound waves to produce detailed images of the heart.  How is this treated?  The treatment is to restore blood flow to the heart as soon as possible. Treatment for this condition may include:  · Oxygen therapy.  · Medicines, such as:  ? Antiplatelet medicines and blood-thinning medicines, such as aspirin. These help prevent blood clots.  ? Medicine that dissolves any blood clots (fibrinolytic therapy).  ? Blood pressure medicines.  ? Nitroglycerin. This helps relieve chest pain and widens blood vessels to improve blood flow.  ? Pain medicine.  ? Cholesterol-lowering medicine.  · Surgery, such as:  ? Coronary angioplasty with stent placement. This involves placing a small piece of metal that looks like mesh or a spring into a narrow coronary artery. This widens the artery and keep it open.  ? Coronary artery bypass surgery. This involves taking a section of a blood vessel from a different part of your body, and placing it on the blocked coronary artery to allow blood to flow around (bypass) the blockage.  · Cardiac rehabilitation. This is a program that helps improve your health and well-being. It includes exercise training, education, and counseling to help you recover.  Follow these instructions at home:  Eating and drinking  · Eat a  heart-healthy diet that includes whole grains, fruits and vegetables, lean proteins, and low-fat or nonfat dairy products.  · Limit how much salt (sodium) you eat as told by your health care provider. Follow instructions from your health care provider about any other eating or drinking restrictions, such as limiting foods that are high in fat and processed sugars.  · Use healthy cooking methods such as roasting, grilling, broiling, baking, poaching, steaming, or stir-frying.  · Talk with a dietitian to learn about healthy cooking methods and how to eat less sodium.  Medicines  · Take over-the-counter and prescription medicines only as told by your health care provider.  · Do not take these medicines unless your health care provider approves:  ? Vitamin supplements that contain vitamin A or vitamin E.  ? Nonsteroidal anti-inflammatory drugs (NSAIDs), such as ibuprofen, naproxen, or celecoxib.  ? Hormone replacement therapy that contains estrogen.  If you are taking blood thinners:  · Talk with your health care provider before you take any medicines that contain aspirin or NSAIDs. These medicines increase your risk for dangerous bleeding.  · Take your medicine exactly as told, at the same time every day.  · Avoid activities that could cause injury or bruising, and follow instructions about how to prevent falls.  · Wear a medical alert bracelet, and carry a card that lists what medicines you take.  Activity  · Join a cardiac rehabilitation program. An exercise plan will be developed for you.  · Ask your health care provider:  ? What activities and exercises are safe for you.  ? If you should follow specific instructions about lifting, driving, or climbing stairs.  Lifestyle  · Do not use any products that contain nicotine or tobacco, such as cigarettes and e-cigarettes. If you need help quitting, ask your health care provider.  · If your health care provider says that alcohol is safe for you, limit your alcohol intake  to no more than 1 drink a day. One drink equals 12 oz of beer, 5 oz of wine, or 1½ oz of hard liquor.  · Maintain a healthy weight. If you need to lose weight, work with your health care provider to do so safely.  General instructions  · Tell all the health care providers who care for you about your heart condition, including your dentist. This may affect the medicines or treatment you receive.  · Manage any other health conditions you have, such as hypertension or diabetes. These conditions affect your heart.  · Learn ways to manage stress.  · Get screened for depression, and get mental health treatment if you need it. People with ACS are at higher risk for depression.  · Keep your vaccinations up to date. Get the flu shot (influenza vaccine) every year.  · If directed, monitor your blood pressure at home.  · Keep all follow-up visits as told by your health care provider. This is important.  Contact a health care provider if:  · You feel overwhelmed or sad.  · You have trouble doing your daily activities.  Get help right away if:  · You have pain in your chest, neck, arm, jaw, stomach, or back that recurs, and:  ? It lasts for more than a few minutes.  ? It is not relieved by taking the medicineyour health care provider prescribed.  · You have unexplained:  ? Heavy sweating.  ? Heartburn or indigestion.  ? Nausea or vomiting.  ? Shortness of breath.  ? Difficulty breathing.  ? Fatigue.  ? Nervousness or anxiety.  ? Weakness.  ? Diarrhea.  ? Dark stools or blood in your stool.  · You have sudden light-headedness or dizziness.  · Your blood pressure is higher than 180/120.  · You faint.  · You have thoughts about hurting yourself.  These symptoms may represent a serious problem that is an emergency. Do not wait to see if the symptoms will go away. Get medical help right away. Call your local emergency services (911 in the [place] Do not drive yourself to the hospital.   If you ever feel like you may hurt yourself or  others, or have thoughts about taking your own life, get help right away. You can go to your nearest emergency department or call:  · Emergency services (911 in the [place]  · A suicide crisis helpline, such as the National Suicide Prevention Lifeline at 1-571.768.1849. This is open 24 hours a day.  Summary  · Acute coronary syndrome (ACS) is when there is not enough blood and oxygen being supplied to the heart. ACS can result in chest pain or a heart attack.  · Acute coronary syndrome is a medical emergency. If you have any symptoms of this condition, get help right away.  · Treatment includes medicines and procedures to open the blocked arteries and restore blood flow.  This information is not intended to replace advice given to you by your health care provider. Make sure you discuss any questions you have with your health care provider.  Document Released: 12/18/2006 Document Revised: 08/28/2018 Document Reviewed: 08/28/2018  [employer] Interactive Patient Education © 2019 [employer]

## 2019-12-23 ENCOUNTER — TELEPHONE (OUTPATIENT)
Dept: CARDIOLOGY | Facility: CLINIC | Age: 74
End: 2019-12-23

## 2019-12-23 NOTE — TELEPHONE ENCOUNTER
----- Message from Kathleen Sarabia LPN sent at 12/20/2019  6:30 PM EST -----      ----- Message -----  From: Romero Trevino APRN  Sent: 12/19/2019   3:03 PM EST  To: Kathleen Sarabia LPN    Scarring noted in the left lung base no acute process.  Keep follow-up.  ----- Message -----  From: Interface, Scans Incoming  Sent: 12/18/2019   3:13 PM EST  To: RODNEY Armenta    XR Chest PA & Lateral   Order: [id number]   Status:  Final result   Visible to patient:  No (Not Released) Dx:  Shortness of breath          Last Resulted: 12/02/19        Order Details       View Encounter       Lab and Collection Details       Routing       Result History            Scans on Order [id number]     Scan on 12/2/2019 by Romero Trevino APRN: XR CHEST PA AND LATERAL

## 2019-12-23 NOTE — TELEPHONE ENCOUNTER
ATTEMPTED TO CONTACT PATIENT. NO ANSWER, DID NOT LEAVE VM AS OUR OFFICE IS CLOSED. WILL RE-CALL PATIENT. -KIRAN;STEVEN

## 2020-01-13 ENCOUNTER — OFFICE VISIT (OUTPATIENT)
Dept: CARDIOLOGY | Facility: CLINIC | Age: 75
End: 2020-01-13

## 2020-01-13 VITALS
SYSTOLIC BLOOD PRESSURE: 122 MMHG | HEART RATE: 90 BPM | DIASTOLIC BLOOD PRESSURE: 78 MMHG | BODY MASS INDEX: 24.75 KG/M2 | WEIGHT: 145 LBS | HEIGHT: 64 IN | OXYGEN SATURATION: 95 %

## 2020-01-13 DIAGNOSIS — R53.82 CHRONIC FATIGUE: Primary | ICD-10-CM

## 2020-01-13 DIAGNOSIS — R42 DIZZINESS: ICD-10-CM

## 2020-01-13 DIAGNOSIS — R05.9 COUGH: ICD-10-CM

## 2020-01-13 PROCEDURE — 99213 OFFICE O/P EST LOW 20 MIN: CPT | Performed by: NURSE PRACTITIONER

## 2020-01-13 NOTE — PROGRESS NOTES
Subjective     Racquel Cheema is a 74 y.o. female who presents to day for Shortness of Breath ( pulomonary testing in december).    CHIEF COMPLIANT  Chief Complaint   Patient presents with   • Shortness of Breath      pulomonary testing in december       Active Problems:  Problem List Items Addressed This Visit        Other    Chronic fatigue - Primary    Dizziness      Other Visit Diagnoses     Cough              HPI  HPI  Ms. Noyola is following up today for shortness of breath, stress, echo, event monitor, and PFT testing in October.  Patient verbalized that she is done really well from the cardiovascular standpoint however she has had the flu approximately 3 weeks ago and then an upper respiratory infection.  Most symptoms have resolved at this time but still remains fatigued to where she gets tired very easily.  But she is noticed that she started to recover from this as well.  Ever since she has had much less lightheadedness since getting over the upper respiratory and infection.  States that her blood pressure is running little lower and is doing well.  Dizziness still does occur on occasion but nowhere near to the intensity as it was prior.  She did measure her blood pressure when she is having the periods of dizziness and lightheadedness and identified that her blood pressure still within normal range.  She states that she is had no further headaches or shoulder pain as well.  Patient's blood pressure is well within normal ranges today.  Patient denies any chest pain, shortness of air, PND, orthopnea, palpitations, lower extremity edema, syncope, or neurological changes.  PRIOR MEDS  Current Outpatient Medications on File Prior to Visit   Medication Sig Dispense Refill   • Ascorbic Acid (VITAMIN C PO) Take 1,000 mg by mouth Daily.     • betamethasone dipropionate (DIPROLENE) 0.05 % lotion 2 (Two) Times a Day.     • Calcium Carbonate-Vitamin D (CALTRATE 600+D PO) Take 1 tablet by mouth Daily.     •  pantoprazole (PROTONIX) 40 MG EC tablet Take 40 mg by mouth Daily. FOR 30 DAYS  5   • triamcinolone (KENALOG) 0.1 % cream 2 (Two) Times a Day.       No current facility-administered medications on file prior to visit.        ALLERGIES  Demerol [meperidine]    HISTORY  Past Medical History:   Diagnosis Date   • Borderline diabetes    • Cancer (CMS/HCC)     lung cancer   • Diverticulosis    • Dizziness    • Eczema    • GERD (gastroesophageal reflux disease)    • Hiatal hernia    • Hyperlipidemia    • IBS (irritable bowel syndrome)    • Rosacea        Social History     Socioeconomic History   • Marital status:      Spouse name: Not on file   • Number of children: Not on file   • Years of education: Not on file   • Highest education level: Not on file   Tobacco Use   • Smoking status: Never Smoker   • Smokeless tobacco: Never Used   Substance and Sexual Activity   • Alcohol use: No   • Drug use: No       Family History   Problem Relation Age of Onset   • Heart attack Mother    • Alzheimer's disease Mother    • Heart attack Father    • Heart attack Brother        Review of Systems   Constitutional: Positive for fatigue. Negative for chills and fever.   HENT: Positive for congestion.    Eyes: Negative.  Negative for visual disturbance.   Respiratory: Positive for cough. Negative for chest tightness and shortness of breath.    Cardiovascular: Negative.  Negative for chest pain, palpitations and leg swelling.   Gastrointestinal: Negative.  Negative for abdominal pain, blood in stool, constipation, nausea and vomiting.   Endocrine: Negative.  Negative for cold intolerance and heat intolerance.   Genitourinary: Negative.  Negative for dysuria, frequency, hematuria and urgency.   Musculoskeletal: Positive for neck pain. Negative for back pain.   Skin: Negative.  Negative for rash and wound.   Allergic/Immunologic: Positive for environmental allergies (seasonal). Negative for food allergies.   Neurological: Positive for  "dizziness (occasionally, not as bad as before). Negative for syncope and light-headedness.   Hematological: Bruises/bleeds easily (bruises).   Psychiatric/Behavioral: Negative for sleep disturbance (denies waking with soa or cp).       Objective     VITALS: /78 (BP Location: Left arm, Patient Position: Sitting)   Pulse 90   Ht 162.6 cm (64\")   Wt 65.8 kg (145 lb)   SpO2 95%   BMI 24.89 kg/m²     LABS:   Lab Results (most recent)     None          IMAGING:   No Images in the past 120 days found..    EXAM:  Physical Exam   Constitutional: She is oriented to person, place, and time. She appears well-developed and well-nourished.   HENT:   Head: Normocephalic and atraumatic.   Eyes: Pupils are equal, round, and reactive to light. EOM are normal.   Neck: Trachea normal and phonation normal. Neck supple. No JVD present. Carotid bruit is not present. No thyroid mass present.   Cardiovascular: Normal rate, regular rhythm, normal heart sounds and intact distal pulses. Exam reveals no gallop and no friction rub.   No murmur heard.  Pulses:       Radial pulses are 2+ on the right side, and 2+ on the left side.        Posterior tibial pulses are 2+ on the right side, and 2+ on the left side.   Pulmonary/Chest: Effort normal and breath sounds normal. No respiratory distress. She has no wheezes. She has no rales.   Abdominal: Soft. Bowel sounds are normal. She exhibits no distension and no abdominal bruit. There is no tenderness.   Musculoskeletal: Normal range of motion.   Neurological: She is alert and oriented to person, place, and time. She has normal strength. No cranial nerve deficit or sensory deficit.   Skin: Skin is warm, dry and intact. Capillary refill takes less than 2 seconds. No rash noted.   Psychiatric: She has a normal mood and affect. Her speech is normal and behavior is normal. Judgment and thought content normal. Cognition and memory are normal.   Nursing note and vitals reviewed.      Procedure   "   Procedures       Assessment/Plan    Diagnosis Plan   1. Chronic fatigue     2. Dizziness     3. Cough     1.  Patient still states that she has fatigue that has exacerbated since she has had the flu and then followed by an upper respiratory infection.  States that she does feel somewhat better and she can tell an improvement in her fatigue.  No intervention is required at this time.  2.  Dizziness has significantly resided and patient verbalized that she is doing much better.  3.  Patient still has a persistent cough but is residing as well.  Patient denies any fevers or production from the cough.  4.  Is doing well from the cardiovascular standpoint had a negative stress and echocardiogram in September 2019.  States that she is doing well.  Recommended six-month follow-up with further discussion I agree that it is appropriate for patient to follow-up on a yearly basis and seek sooner follow-up as needed.  5.  Informed of signs and symptoms of ACS and advised to seek emergent treatment for any new worsening symptoms.  Patient also advised sooner follow-up as needed.    Return in about 1 year (around 1/13/2021), or if symptoms worsen or fail to improve.    Racquel was seen today for shortness of breath.    Diagnoses and all orders for this visit:    Chronic fatigue    Dizziness    Cough        Racquel Cheema  reports that she has never smoked. She has never used smokeless tobacco..       Patient's Body mass index is 24.89 kg/m². BMI is within normal parameters. No follow-up required.\.           MEDS ORDERED DURING VISIT:  No orders of the defined types were placed in this encounter.          This document has been electronically signed by Romero Trevino Jr., APRN  January 14, 2020 10:02 AM

## 2020-01-13 NOTE — PATIENT INSTRUCTIONS
"Fat and Cholesterol Restricted Eating Plan  Getting too much fat and cholesterol in your diet may cause health problems. Choosing the right foods helps keep your fat and cholesterol at normal levels. This can keep you from getting certain diseases.  Your doctor may recommend an eating plan that includes:  · Total fat: ______% or less of total calories a day.  · Saturated fat: ______% or less of total calories a day.  · Cholesterol: less than _________mg a day.  · Fiber: ______g a day.  What are tips for following this plan?  Meal planning  · At meals, divide your plate into four equal parts:  ? Fill one-half of your plate with vegetables and green salads.  ? Fill one-fourth of your plate with whole grains.  ? Fill one-fourth of your plate with low-fat (lean) protein foods.  · Eat fish that is high in omega-3 fats at least two times a week. This includes mackerel, tuna, sardines, and salmon.  · Eat foods that are high in fiber, such as whole grains, beans, apples, broccoli, carrots, peas, and barley.  General tips    · Work with your doctor to lose weight if you need to.  · Avoid:  ? Foods with added sugar.  ? Fried foods.  ? Foods with partially hydrogenated oils.  · Limit alcohol intake to no more than 1 drink a day for nonpregnant women and 2 drinks a day for men. One drink equals 12 oz of beer, 5 oz of wine, or 1½ oz of hard liquor.  Reading food labels  · Check food labels for:  ? Trans fats.  ? Partially hydrogenated oils.  ? Saturated fat (g) in each serving.  ? Cholesterol (mg) in each serving.  ? Fiber (g) in each serving.  · Choose foods with healthy fats, such as:  ? Monounsaturated fats.  ? Polyunsaturated fats.  ? Omega-3 fats.  · Choose grain products that have whole grains. Look for the word \"whole\" as the first word in the ingredient list.  Cooking  · Cook foods using low-fat methods. These include baking, boiling, grilling, and broiling.  · Eat more home-cooked foods. Eat at restaurants and buffets " less often.  · Avoid cooking using saturated fats, such as butter, cream, palm oil, palm kernel oil, and coconut oil.  Recommended foods    Fruits  · All fresh, canned (in natural juice), or frozen fruits.  Vegetables  · Fresh or frozen vegetables (raw, steamed, roasted, or grilled). Green salads.  Grains  · Whole grains, such as whole wheat or whole grain breads, crackers, cereals, and pasta. Unsweetened oatmeal, bulgur, barley, quinoa, or brown rice. Corn or whole wheat flour tortillas.  Meats and other protein foods  · Ground beef (85% or leaner), grass-fed beef, or beef trimmed of fat. Skinless chicken or turkey. Ground chicken or turkey. Pork trimmed of fat. All fish and seafood. Egg whites. Dried beans, peas, or lentils. Unsalted nuts or seeds. Unsalted canned beans. Nut butters without added sugar or oil.  Dairy  · Low-fat or nonfat dairy products, such as skim or 1% milk, 2% or reduced-fat cheeses, low-fat and fat-free ricotta or cottage cheese, or plain low-fat and nonfat yogurt.  Fats and oils  · Tub margarine without trans fats. Light or reduced-fat mayonnaise and salad dressings. Avocado. Olive, canola, sesame, or safflower oils.  The items listed above may not be a complete list of foods and beverages you can eat. Contact a dietitian for more information.  Foods to avoid  Fruits  · Canned fruit in heavy syrup. Fruit in cream or butter sauce. Fried fruit.  Vegetables  · Vegetables cooked in cheese, cream, or butter sauce. Fried vegetables.  Grains  · White bread. White pasta. White rice. Cornbread. Bagels, pastries, and croissants. Crackers and snack foods that contain trans fat and hydrogenated oils.  Meats and other protein foods  · Fatty cuts of meat. Ribs, chicken wings, pulido, sausage, bologna, salami, chitterlings, fatback, hot dogs, bratwurst, and packaged lunch meats. Liver and organ meats. Whole eggs and egg yolks. Chicken and turkey with skin. Fried meat.  Dairy  · Whole or 2% milk, cream,  half-and-half, and cream cheese. Whole milk cheeses. Whole-fat or sweetened yogurt. Full-fat cheeses. Nondairy creamers and whipped toppings. Processed cheese, cheese spreads, and cheese curds.  Beverages  · Alcohol. Sugar-sweetened drinks such as sodas, lemonade, and fruit drinks.  Fats and oils  · Butter, stick margarine, lard, shortening, ghee, or pulido fat. Coconut, palm kernel, and palm oils.  Sweets and desserts  · Corn syrup, sugars, honey, and molasses. Candy. Jam and jelly. Syrup. Sweetened cereals. Cookies, pies, cakes, donuts, muffins, and ice cream.  The items listed above may not be a complete list of foods and beverages you should avoid. Contact a dietitian for more information.  Summary  · Choosing the right foods helps keep your fat and cholesterol at normal levels. This can keep you from getting certain diseases.  · At meals, fill one-half of your plate with vegetables and green salads.  · Eat high-fiber foods, like whole grains, beans, apples, carrots, peas, and barley.  · Limit added sugar, saturated fats, alcohol, and fried foods.  This information is not intended to replace advice given to you by your health care provider. Make sure you discuss any questions you have with your health care provider.  Document Released: 06/18/2013 Document Revised: 08/21/2019 Document Reviewed: 09/04/2018  [employer] Interactive Patient Education © 2019 [employer]    Acute Coronary Syndrome    Acute coronary syndrome (ACS) is a serious problem in which there is suddenly not enough blood and oxygen reaching the heart. ACS can result in chest pain or a heart attack.  This condition is a medical emergency. If you have any symptoms of this condition, get help right away.  What are the causes?  This condition may be caused by:  · Buildup of fat and cholesterol inside of the arteries (atherosclerosis). This is the most common cause. The buildup (plaque) can cause blood vessels in the heart (coronary arteries) to become  narrow or blocked, which reduces blood flow to the heart. Plaque can also break off and lead to a clot, which can block an artery and cause a heart attack or stroke.  · Sudden tightening of the muscles around the coronary arteries (coronary spasm).  · Tearing of a coronary artery (spontaneous coronary artery dissection).  · Very low blood pressure (hypotension).  · An abnormal heartbeat (arrhythmia).  · Other medical conditions that cause a decrease of oxygen to the heart, such as anemiaorrespiratory failure.  · Using cocaine or methamphetamine.  What increases the risk?  The following factors may make you more likely to develop this condition:  · Age. The risk for ACS increases as you get older.  · History of chest pain, heart attack, peripheral artery disease, or stroke.  · Having taken chemotherapy or immune-suppressing medicines.  · Being male.  · Family history of chest pain, heart disease, or stroke.  · Smoking.  · Not exercising enough.  · Being overweight.  · High cholesterol.  · High blood pressure (hypertension).  · Diabetes.  · Excessive alcohol use.  What are the signs or symptoms?  Common symptoms of this condition include:  · Chest pain. The pain may last a long time, or it may stop and come back (recur). It may feel like:  ? Crushing or squeezing.  ? Tightness, pressure, fullness, or heaviness.  · Arm, neck, jaw, or back pain.  · Heartburn or indigestion.  · Shortness of breath.  · Nausea.  · Sudden cold sweats.  · Light-headedness.  · Dizziness, or passing out.  · Tiredness (fatigue).  Sometimes there are no symptoms.  How is this diagnosed?  This condition may be diagnosed based on:  · Your medical history and symptoms.  · An electrocardiogram (ECG). This imaging test measures the heart's electrical activity.  · Blood tests. Cardiac blood tests may need to be repeated at designated time intervals.  · Chest X-ray.  · A CT scan of the chest.  · A coronary angiogram. This is a procedure in which dye is  injected into the bloodstream and then X-rays are taken to show if there is a blockage in a coronary artery.  · Exercise stress testing.  · Echocardiography. This is a test that uses sound waves to produce detailed images of the heart.  How is this treated?  The treatment is to restore blood flow to the heart as soon as possible. Treatment for this condition may include:  · Oxygen therapy.  · Medicines, such as:  ? Antiplatelet medicines and blood-thinning medicines, such as aspirin. These help prevent blood clots.  ? Medicine that dissolves any blood clots (fibrinolytic therapy).  ? Blood pressure medicines.  ? Nitroglycerin. This helps relieve chest pain and widens blood vessels to improve blood flow.  ? Pain medicine.  ? Cholesterol-lowering medicine.  · Surgery, such as:  ? Coronary angioplasty with stent placement. This involves placing a small piece of metal that looks like mesh or a spring into a narrow coronary artery. This widens the artery and keep it open.  ? Coronary artery bypass surgery. This involves taking a section of a blood vessel from a different part of your body, and placing it on the blocked coronary artery to allow blood to flow around (bypass) the blockage.  · Cardiac rehabilitation. This is a program that helps improve your health and well-being. It includes exercise training, education, and counseling to help you recover.  Follow these instructions at home:  Eating and drinking  · Eat a heart-healthy diet that includes whole grains, fruits and vegetables, lean proteins, and low-fat or nonfat dairy products.  · Limit how much salt (sodium) you eat as told by your health care provider. Follow instructions from your health care provider about any other eating or drinking restrictions, such as limiting foods that are high in fat and processed sugars.  · Use healthy cooking methods such as roasting, grilling, broiling, baking, poaching, steaming, or stir-frying.  · Talk with a dietitian to  learn about healthy cooking methods and how to eat less sodium.  Medicines  · Take over-the-counter and prescription medicines only as told by your health care provider.  · Do not take these medicines unless your health care provider approves:  ? Vitamin supplements that contain vitamin A or vitamin E.  ? Nonsteroidal anti-inflammatory drugs (NSAIDs), such as ibuprofen, naproxen, or celecoxib.  ? Hormone replacement therapy that contains estrogen.  If you are taking blood thinners:  · Talk with your health care provider before you take any medicines that contain aspirin or NSAIDs. These medicines increase your risk for dangerous bleeding.  · Take your medicine exactly as told, at the same time every day.  · Avoid activities that could cause injury or bruising, and follow instructions about how to prevent falls.  · Wear a medical alert bracelet, and carry a card that lists what medicines you take.  Activity  · Join a cardiac rehabilitation program. An exercise plan will be developed for you.  · Ask your health care provider:  ? What activities and exercises are safe for you.  ? If you should follow specific instructions about lifting, driving, or climbing stairs.  Lifestyle  · Do not use any products that contain nicotine or tobacco, such as cigarettes and e-cigarettes. If you need help quitting, ask your health care provider.  · If your health care provider says that alcohol is safe for you, limit your alcohol intake to no more than 1 drink a day. One drink equals 12 oz of beer, 5 oz of wine, or 1½ oz of hard liquor.  · Maintain a healthy weight. If you need to lose weight, work with your health care provider to do so safely.  General instructions  · Tell all the health care providers who care for you about your heart condition, including your dentist. This may affect the medicines or treatment you receive.  · Manage any other health conditions you have, such as hypertension or diabetes. These conditions affect your  heart.  · Learn ways to manage stress.  · Get screened for depression, and get mental health treatment if you need it. People with ACS are at higher risk for depression.  · Keep your vaccinations up to date. Get the flu shot (influenza vaccine) every year.  · If directed, monitor your blood pressure at home.  · Keep all follow-up visits as told by your health care provider. This is important.  Contact a health care provider if:  · You feel overwhelmed or sad.  · You have trouble doing your daily activities.  Get help right away if:  · You have pain in your chest, neck, arm, jaw, stomach, or back that recurs, and:  ? It lasts for more than a few minutes.  ? It is not relieved by taking the medicineyour health care provider prescribed.  · You have unexplained:  ? Heavy sweating.  ? Heartburn or indigestion.  ? Nausea or vomiting.  ? Shortness of breath.  ? Difficulty breathing.  ? Fatigue.  ? Nervousness or anxiety.  ? Weakness.  ? Diarrhea.  ? Dark stools or blood in your stool.  · You have sudden light-headedness or dizziness.  · Your blood pressure is higher than 180/120.  · You faint.  · You have thoughts about hurting yourself.  These symptoms may represent a serious problem that is an emergency. Do not wait to see if the symptoms will go away. Get medical help right away. Call your local emergency services (438 in the [place] Do not drive yourself to the hospital.   If you ever feel like you may hurt yourself or others, or have thoughts about taking your own life, get help right away. You can go to your nearest emergency department or call:  · Emergency services (250 in the [place]  · A suicide crisis helpline, such as the National Suicide Prevention Lifeline at 1-882.269.8660. This is open 24 hours a day.  Summary  · Acute coronary syndrome (ACS) is when there is not enough blood and oxygen being supplied to the heart. ACS can result in chest pain or a heart attack.  · Acute coronary syndrome is a medical  emergency. If you have any symptoms of this condition, get help right away.  · Treatment includes medicines and procedures to open the blocked arteries and restore blood flow.  This information is not intended to replace advice given to you by your health care provider. Make sure you discuss any questions you have with your health care provider.  Document Released: 12/18/2006 Document Revised: 08/28/2018 Document Reviewed: 08/28/2018  [employer] Interactive Patient Education © 2019 [employer]

## 2022-03-10 ENCOUNTER — OFFICE VISIT (OUTPATIENT)
Dept: CARDIOLOGY | Facility: CLINIC | Age: 77
End: 2022-03-10

## 2022-03-10 VITALS
WEIGHT: 143.8 LBS | BODY MASS INDEX: 25.48 KG/M2 | OXYGEN SATURATION: 98 % | SYSTOLIC BLOOD PRESSURE: 120 MMHG | DIASTOLIC BLOOD PRESSURE: 73 MMHG | HEIGHT: 63 IN | HEART RATE: 69 BPM

## 2022-03-10 DIAGNOSIS — R42 DIZZINESS: ICD-10-CM

## 2022-03-10 DIAGNOSIS — R06.02 SHORTNESS OF BREATH: Primary | ICD-10-CM

## 2022-03-10 DIAGNOSIS — Z86.16 HISTORY OF COVID-19: ICD-10-CM

## 2022-03-10 DIAGNOSIS — R53.82 CHRONIC FATIGUE: ICD-10-CM

## 2022-03-10 DIAGNOSIS — R00.2 PALPITATIONS: ICD-10-CM

## 2022-03-10 PROCEDURE — 99214 OFFICE O/P EST MOD 30 MIN: CPT | Performed by: NURSE PRACTITIONER

## 2022-03-10 RX ORDER — ZINC SULFATE 50(220)MG
220 CAPSULE ORAL DAILY
COMMUNITY

## 2022-03-10 RX ORDER — LANOLIN ALCOHOL/MO/W.PET/CERES
1000 CREAM (GRAM) TOPICAL DAILY
COMMUNITY

## 2022-03-10 RX ORDER — ERGOCALCIFEROL (VITAMIN D2) 10 MCG
400 TABLET ORAL DAILY
COMMUNITY

## 2022-03-10 NOTE — PROGRESS NOTES
Subjective     Racquel Cheema is a 76 y.o. female who presents to day for Palpitations (Presents for eval, hospitalized for Covid pneumonia 12/2021).    CHIEF COMPLIANT  Chief Complaint   Patient presents with   • Palpitations     Presents for eval, hospitalized for Covid pneumonia 12/2021       Active Problems:  Problem List Items Addressed This Visit        Cardiac and Vasculature    Palpitations    Relevant Orders    Adult Transthoracic Echo Complete W/ Cont if Necessary Per Protocol       Pulmonary and Pneumonias    Shortness of breath - Primary    Relevant Orders    Adult Transthoracic Echo Complete W/ Cont if Necessary Per Protocol       Symptoms and Signs    Chronic fatigue    Relevant Orders    Adult Transthoracic Echo Complete W/ Cont if Necessary Per Protocol    Dizziness    Relevant Orders    Adult Transthoracic Echo Complete W/ Cont if Necessary Per Protocol      Other Visit Diagnoses     History of COVID-19        Relevant Orders    Adult Transthoracic Echo Complete W/ Cont if Necessary Per Protocol            PROBLEM LIST:      1. Hyperlipidemia  2. Chest Pain  2.1 9/19 negative stress test with preserved ejection fraction  2.2 9/19 echocardiogram with mild left ventricular hypertrophy and diastolic dysfunction stage I ejection fraction of 56 to 60%  3. Shortness of breath  4. Fatigue  5. Dizziness  6. Palpitations  7. Lung CA  8. Borderline Diabetes  9. GERD  10. IBS  11. Diverticulosis    HPI  HPI  Ms. Cheema is a 76-year-old female patient who is being followed up today for hospital follow-up from Covid pneumonia in December 2021.  Patient does report shortness of breath that occurs with activity but does not occur at rest.  She does have associated cough and wheezing.  She is also been followed by Dr. Robert in which she has PFTs scheduled on the 23rd of this month.  She also reports palpitations that occur with activity and at rest.  She has an intermittent racing type sensation in her chest  pierces her heart rate easily gets up 120.  She says that her palpitations have improved the longer she gets out from COVID.  She also has random episodes of dizziness.  She had chest x-rays 3 weeks ago which showed minimal improvement in her Covid pneumonia.  She does report fatigue where she gets tired very easily.  She denies any chest pain, lower extremity edema, syncope, PND, orthopnea, or strokelike symptoms.  We did review patient's labs from the hospital which identified mild elevations of her liver function AST and ALT.  She also had elevated blood glucose throughout.  Her troponins remain normal throughout.  We also reviewed her EKG that showed a normal sinus rhythm with the way of 89 and nonspecific ST changes with a normal axis.  No acute changes were noted.  PRIOR MEDS  Current Outpatient Medications on File Prior to Visit   Medication Sig Dispense Refill   • Ascorbic Acid (VITAMIN C PO) Take 1,000 mg by mouth Daily.     • ELDERBERRY PO Take  by mouth.     • Menaquinone-7 (VITAMIN K2 PO) Take  by mouth.     • pantoprazole (PROTONIX) 40 MG EC tablet Take 40 mg by mouth Daily. FOR 30 DAYS  5   • vitamin B-12 (CYANOCOBALAMIN) 1000 MCG tablet Take 1,000 mcg by mouth Daily.     • Vitamin D, Cholecalciferol, (CHOLECALCIFEROL) 10 MCG (400 UNIT) tablet Take 400 Units by mouth Daily.     • zinc sulfate (ZINCATE) 220 (50 Zn) MG capsule Take 220 mg by mouth Daily.       No current facility-administered medications on file prior to visit.       ALLERGIES  Demerol [meperidine]    HISTORY  Past Medical History:   Diagnosis Date   • Borderline diabetes    • Cancer (HCC)     lung cancer   • Diverticulosis    • Dizziness    • Eczema    • GERD (gastroesophageal reflux disease)    • Hiatal hernia    • Hyperlipidemia    • IBS (irritable bowel syndrome)    • Pneumonia due to COVID-19 virus 12/2021   • Rosacea        Social History     Socioeconomic History   • Marital status:    Tobacco Use   • Smoking status: Never  "Smoker   • Smokeless tobacco: Never Used   Substance and Sexual Activity   • Alcohol use: No   • Drug use: No       Family History   Problem Relation Age of Onset   • Heart attack Mother    • Alzheimer's disease Mother    • Heart attack Father    • Heart attack Brother        Review of Systems   Constitutional: Positive for fatigue (Covid 12/2021). Negative for chills, diaphoresis and fever.   HENT: Negative.    Eyes: Negative.  Negative for visual disturbance.   Respiratory: Positive for cough, shortness of breath (with activity) and wheezing. Negative for apnea and chest tightness.         Hospitalized 5 days for Covid pneumonia and flu in 12/2021   Cardiovascular: Positive for palpitations (random episodes of racing HR up to 120). Negative for chest pain and leg swelling.   Gastrointestinal: Positive for diarrhea (the last 10 days, awaiting test results) and nausea. Negative for abdominal pain, blood in stool, constipation and vomiting.   Endocrine: Negative.    Genitourinary: Negative.  Negative for hematuria.   Musculoskeletal: Positive for arthralgias, back pain, myalgias and neck pain.   Skin: Negative.    Allergic/Immunologic: Negative.    Neurological: Positive for dizziness (random episodes). Negative for syncope, weakness, light-headedness, numbness and headaches.   Hematological: Negative.  Does not bruise/bleed easily.   Psychiatric/Behavioral: Negative.  Negative for sleep disturbance.       Objective     VITALS: /73 (BP Location: Left arm, Patient Position: Sitting)   Pulse 69   Ht 160 cm (63\")   Wt 65.2 kg (143 lb 12.8 oz)   SpO2 98%   BMI 25.47 kg/m²     LABS:   Lab Results (most recent)     None          IMAGING:   No Images in the past 120 days found..    EXAM:  Physical Exam  Vitals and nursing note reviewed.   Constitutional:       Appearance: She is well-developed.   HENT:      Head: Normocephalic.   Eyes:      Pupils: Pupils are equal, round, and reactive to light.   Neck:      " Thyroid: No thyroid mass.      Vascular: No carotid bruit or JVD.      Trachea: Trachea and phonation normal.   Cardiovascular:      Rate and Rhythm: Normal rate and regular rhythm.      Pulses:           Radial pulses are 2+ on the right side and 2+ on the left side.        Posterior tibial pulses are 2+ on the right side and 2+ on the left side.      Heart sounds: Normal heart sounds. No murmur heard.    No friction rub. No gallop.   Pulmonary:      Effort: Pulmonary effort is normal. No respiratory distress.      Breath sounds: Decreased air movement present. Decreased breath sounds present. No wheezing or rales.   Abdominal:      General: Bowel sounds are normal.      Palpations: Abdomen is soft.   Musculoskeletal:         General: No swelling. Normal range of motion.      Cervical back: Neck supple.   Skin:     General: Skin is warm and dry.      Capillary Refill: Capillary refill takes less than 2 seconds.      Findings: No rash.   Neurological:      Mental Status: She is alert and oriented to person, place, and time.   Psychiatric:         Speech: Speech normal.         Behavior: Behavior normal.         Thought Content: Thought content normal.         Judgment: Judgment normal.         Procedure   Procedures       Assessment/Plan    Diagnosis Plan   1. Shortness of breath  Adult Transthoracic Echo Complete W/ Cont if Necessary Per Protocol   2. Palpitations  Adult Transthoracic Echo Complete W/ Cont if Necessary Per Protocol   3. Chronic fatigue  Adult Transthoracic Echo Complete W/ Cont if Necessary Per Protocol   4. Dizziness  Adult Transthoracic Echo Complete W/ Cont if Necessary Per Protocol   5. History of COVID-19  Adult Transthoracic Echo Complete W/ Cont if Necessary Per Protocol   1.  Due to patient's significant shortness of breath, chronic fatigue, and recent history of COVID-19 I do feel it is appropriate to reevaluate patient with an echocardiogram.  2.  Patient does have a history of COVID  pneumonia which she is being closely followed by pulmonology.  The chest x-ray 3 weeks ago showed minimal improvement per patient's report.  She is scheduled for PFTs on the 23rd with Dr. Yesica Kim.  She is strongly encouraged to keep her follow-up.  3.  Patient does have palpitations which have continuously improved the longer she gets out from COVID.  We will continue to monitor.  4.  Informed of signs and symptoms of ACS and advised to seek emergent treatment for any new worsening symptoms.  Patient also advised sooner follow-up as needed.  Also advised to follow-up with family doctor as needed  This note is dictated utilizing voice recognition software.  Although this record has been proof read, transcriptional errors may still be present. If questions occur regarding the content of this record please do not hesitate to call our office.  I have reviewed and confirmed the accuracy of the ROS as documented by the MA/LPN/RN RODNEY Armenta    Return in about 3 months (around 6/10/2022), or if symptoms worsen or fail to improve.    Diagnoses and all orders for this visit:    1. Shortness of breath (Primary)  -     Adult Transthoracic Echo Complete W/ Cont if Necessary Per Protocol; Future    2. Palpitations  -     Adult Transthoracic Echo Complete W/ Cont if Necessary Per Protocol; Future    3. Chronic fatigue  -     Adult Transthoracic Echo Complete W/ Cont if Necessary Per Protocol; Future    4. Dizziness  -     Adult Transthoracic Echo Complete W/ Cont if Necessary Per Protocol; Future    5. History of COVID-19  -     Adult Transthoracic Echo Complete W/ Cont if Necessary Per Protocol; Future        Advance Care Planning   ACP discussion was held with the patient during this visit. Patient has an advance directive (not in EMR), copy requested.         MEDS ORDERED DURING VISIT:  No orders of the defined types were placed in this encounter.          This document has been electronically signed by  Romero Trevino Jr., APRN  March 24, 2022 21:54 EDT

## 2022-04-20 ENCOUNTER — HOSPITAL ENCOUNTER (OUTPATIENT)
Dept: CARDIOLOGY | Facility: HOSPITAL | Age: 77
Discharge: HOME | End: 2022-04-20
Admitting: NURSE PRACTITIONER

## 2022-04-20 DIAGNOSIS — R42 DIZZINESS: ICD-10-CM

## 2022-04-20 DIAGNOSIS — R06.02 SHORTNESS OF BREATH: ICD-10-CM

## 2022-04-20 DIAGNOSIS — Z86.16 HISTORY OF COVID-19: ICD-10-CM

## 2022-04-20 DIAGNOSIS — R00.2 PALPITATIONS: ICD-10-CM

## 2022-04-20 DIAGNOSIS — R53.82 CHRONIC FATIGUE: ICD-10-CM

## 2022-04-20 PROCEDURE — 93306 TTE W/DOPPLER COMPLETE: CPT | Performed by: INTERNAL MEDICINE

## 2022-04-20 PROCEDURE — 93306 TTE W/DOPPLER COMPLETE: CPT

## 2022-05-02 ENCOUNTER — TELEPHONE (OUTPATIENT)
Dept: CARDIOLOGY | Facility: CLINIC | Age: 77
End: 2022-05-02

## 2022-05-02 NOTE — TELEPHONE ENCOUNTER
Patient called to check on her testing results, her testing was on 04/20/2022. No one called her regarding results. She also asked if it would be uploaded onto [employer]? Thank you!

## 2022-05-03 NOTE — TELEPHONE ENCOUNTER
I called patient and explained that her results were not complete but that I would check on them and will let her know as soon as they are released .    Dione OSMAN

## 2022-05-04 ENCOUNTER — TELEPHONE (OUTPATIENT)
Dept: CARDIOLOGY | Facility: CLINIC | Age: 77
End: 2022-05-04

## 2022-05-04 LAB
BH CV ECHO MEAS - ACS: 1.85 CM
BH CV ECHO MEAS - AO MAX PG: 5.6 MMHG
BH CV ECHO MEAS - AO MEAN PG: 3.1 MMHG
BH CV ECHO MEAS - AO ROOT DIAM: 3.8 CM
BH CV ECHO MEAS - AO V2 MAX: 118.1 CM/SEC
BH CV ECHO MEAS - AO V2 VTI: 24.5 CM
BH CV ECHO MEAS - EDV(CUBED): 15.4 ML
BH CV ECHO MEAS - EDV(MOD-SP4): 51.9 ML
BH CV ECHO MEAS - EF(MOD-SP4): 54.3 %
BH CV ECHO MEAS - EF_3D-VOL: 69 %
BH CV ECHO MEAS - ESV(CUBED): 4.8 ML
BH CV ECHO MEAS - ESV(MOD-SP4): 23.7 ML
BH CV ECHO MEAS - FS: 32.1 %
BH CV ECHO MEAS - IVS/LVPW: 1.28 CM
BH CV ECHO MEAS - IVSD: 1.52 CM
BH CV ECHO MEAS - LA DIMENSION: 3 CM
BH CV ECHO MEAS - LAT PEAK E' VEL: 5.1 CM/SEC
BH CV ECHO MEAS - LV DIASTOLIC VOL/BSA (35-75): 31 CM2
BH CV ECHO MEAS - LV MASS(C)D: 104.7 GRAMS
BH CV ECHO MEAS - LV SYSTOLIC VOL/BSA (12-30): 14.1 CM2
BH CV ECHO MEAS - LVIDD: 2.49 CM
BH CV ECHO MEAS - LVIDS: 1.69 CM
BH CV ECHO MEAS - LVOT AREA: 3 CM2
BH CV ECHO MEAS - LVOT DIAM: 1.94 CM
BH CV ECHO MEAS - LVPWD: 1.19 CM
BH CV ECHO MEAS - MED PEAK E' VEL: 4.9 CM/SEC
BH CV ECHO MEAS - MV A MAX VEL: 108.8 CM/SEC
BH CV ECHO MEAS - MV DEC SLOPE: 240.6 CM/SEC2
BH CV ECHO MEAS - MV E MAX VEL: 76.3 CM/SEC
BH CV ECHO MEAS - MV E/A: 0.7
BH CV ECHO MEAS - RVDD: 2.5 CM
BH CV ECHO MEAS - SI(MOD-SP4): 16.8 ML/M2
BH CV ECHO MEAS - SV(MOD-SP4): 28.2 ML
BH CV ECHO MEASUREMENTS AVERAGE E/E' RATIO: 15.26
LEFT ATRIUM VOLUME INDEX: 12 ML/M2
MAXIMAL PREDICTED HEART RATE: 145 BPM
STRESS TARGET HR: 123 BPM

## 2022-05-04 NOTE — TELEPHONE ENCOUNTER
ECHO  Called pt to notify of no acute findings on testing, no answer lvm.    ----- Message from Helena Brown MA sent at 5/4/2022  3:02 PM EDT -----    ----- Message -----  From: Romero Trevino APRN  Sent: 5/4/2022   1:10 PM EDT  To: Helena Brown MA    There is no acute findings on the echocardiogram.  Keep follow-up.  ----- Message -----  From: Timmy Donald MD  Sent: 5/4/2022   6:28 AM EDT  To: RODNEY Armenta

## 2022-09-19 ENCOUNTER — OFFICE VISIT (OUTPATIENT)
Dept: CARDIOLOGY | Facility: CLINIC | Age: 77
End: 2022-09-19

## 2022-09-19 VITALS
BODY MASS INDEX: 25 KG/M2 | HEIGHT: 63 IN | DIASTOLIC BLOOD PRESSURE: 65 MMHG | WEIGHT: 141.1 LBS | HEART RATE: 72 BPM | SYSTOLIC BLOOD PRESSURE: 125 MMHG | OXYGEN SATURATION: 98 %

## 2022-09-19 DIAGNOSIS — E78.2 MIXED HYPERLIPIDEMIA: ICD-10-CM

## 2022-09-19 DIAGNOSIS — R06.02 SHORTNESS OF BREATH: Primary | ICD-10-CM

## 2022-09-19 PROCEDURE — 99213 OFFICE O/P EST LOW 20 MIN: CPT | Performed by: NURSE PRACTITIONER

## 2022-09-19 NOTE — PROGRESS NOTES
"Subjective     Racquel Cheema is a 76 y.o. female who presents to day for 3 month follow up / testing (Echo /  Colonoscopy and Upper GI coming up).    CHIEF COMPLIANT  Chief Complaint   Patient presents with   • 3 month follow up / testing     Echo /  Colonoscopy and Upper GI coming up       Active Problems:  Problem List Items Addressed This Visit        Pulmonary and Pneumonias    Shortness of breath - Primary      Other Visit Diagnoses     Mixed hyperlipidemia          PROBLEM LIST:      1. Hyperlipidemia  2. Chest Pain  2.1 9/19 negative stress test with preserved ejection fraction  2.2 echocardiogram 4/22: EF 60 to 65%, grade 1A diastolic dysfunction, trivial MR, trivial AI, physiological TR  3. Shortness of breath  4. Fatigue  5. Dizziness  6. Palpitations  7. Lung CA  8. Diabetes      HPI  Ms. Cheema presents today for echocardiogram results.  She was found to have a preserved ejection fraction of 60 to 65 percent with grade 1A diastolic dysfunction, trivial MR and AI, physiological TR, and mild dilation of her aortic root without aneurysm or dissection at 3.8 cm.  We did discuss these findings in detail.      The patient states her recent blood work from 3 weeks ago showed her A1c was at 10 percent. She is not currently on any medications for diabetes.  She will be following up with her PCP for further discussion.The patient notes that she has been prediabetic for years with an A1c at 6 percent.    Her glucose was 104 mg/dL. She reports her cholesterol has \"always\" been elevated. The patient's recent lab work showed her HDL was 50 mg/dL, triglycerides were 77 mg/dL and LDL was 80 mg/dL. The patient was told she has fatty liver and had an elevated ALT.       The patient notes she got sick with on 11/24/2021 with COVID-19, then 2 weeks after that she was diagnosed with the flu. She states she was diagnosed with double pneumonia in 12/2021. The patient follows up with a pulmonologist, Dr. Robert.  She mentions " her right lung is not completely healed, but it is sounding better.    Additionally, she notes that she has experienced hair loss.  The patient notes that she gets lightheaded when she takes deep breaths, and she was told that when she takes a deep breath it does not disperse correctly.        The patient mentions that she believes her symptoms of dizziness and leaning to one side was caused by cervicogenic vertigo.       PRIOR MEDS  Current Outpatient Medications on File Prior to Visit   Medication Sig Dispense Refill   • Ascorbic Acid (VITAMIN C PO) Take 1,000 mg by mouth Daily.     • pantoprazole (PROTONIX) 40 MG EC tablet Take 40 mg by mouth Daily. FOR 30 DAYS  5   • vitamin B-12 (CYANOCOBALAMIN) 1000 MCG tablet Take 1,000 mcg by mouth Daily.     • Vitamin D, Cholecalciferol, (CHOLECALCIFEROL) 10 MCG (400 UNIT) tablet Take 400 Units by mouth Daily.     • zinc sulfate (ZINCATE) 220 (50 Zn) MG capsule Take 220 mg by mouth Daily.       No current facility-administered medications on file prior to visit.       ALLERGIES  Demerol [meperidine]    HISTORY  Past Medical History:   Diagnosis Date   • Borderline diabetes    • Cancer (HCC)     lung cancer   • Diverticulosis    • Dizziness    • Eczema    • GERD (gastroesophageal reflux disease)    • Hiatal hernia    • Hyperlipidemia    • IBS (irritable bowel syndrome)    • Pneumonia due to COVID-19 virus 12/2021   • Rosacea        Social History     Socioeconomic History   • Marital status:    Tobacco Use   • Smoking status: Never Smoker   • Smokeless tobacco: Never Used   Substance and Sexual Activity   • Alcohol use: No   • Drug use: No       Family History   Problem Relation Age of Onset   • Heart attack Mother    • Alzheimer's disease Mother    • Heart attack Father    • Heart attack Brother        Review of Systems   Constitutional: Positive for fatigue. Negative for chills and fever.   HENT: Positive for postnasal drip (always since Covid). Negative for  "congestion, rhinorrhea and sore throat.    Respiratory: Positive for shortness of breath (from Covid pneumonia with exertion). Negative for chest tightness.    Cardiovascular: Negative for chest pain, palpitations and leg swelling.   Gastrointestinal: Positive for diarrhea and nausea. Negative for constipation.   Musculoskeletal: Positive for arthralgias, back pain and neck pain.   Allergic/Immunologic: Positive for environmental allergies. Negative for food allergies.   Neurological: Positive for dizziness (is caused by Cerviogenic  Vertigo) and light-headedness. Negative for syncope and weakness.   Hematological: Bruises/bleeds easily.   Psychiatric/Behavioral: Negative for sleep disturbance.       Objective     VITALS: /65 (BP Location: Left arm, Patient Position: Sitting)   Pulse 72   Ht 160 cm (62.99\")   Wt 64 kg (141 lb 1.6 oz)   SpO2 98%   BMI 25.00 kg/m²     LABS:   Lab Results (most recent)     None          IMAGING:   No Images in the past 120 days found..    EXAM:  Physical Exam  Vitals and nursing note reviewed.   Constitutional:       Appearance: She is well-developed.   HENT:      Head: Normocephalic.   Eyes:      Pupils: Pupils are equal, round, and reactive to light.   Neck:      Thyroid: No thyroid mass.      Vascular: No carotid bruit or JVD.      Trachea: Trachea and phonation normal.   Cardiovascular:      Rate and Rhythm: Normal rate and regular rhythm.      Pulses:           Radial pulses are 2+ on the right side and 2+ on the left side.        Posterior tibial pulses are 2+ on the right side and 2+ on the left side.      Heart sounds: Normal heart sounds. No murmur heard.    No friction rub. No gallop.   Pulmonary:      Effort: Pulmonary effort is normal. No respiratory distress.      Breath sounds: Normal breath sounds. No wheezing or rales.   Abdominal:      General: Bowel sounds are normal.      Palpations: Abdomen is soft.   Musculoskeletal:         General: No swelling. Normal " range of motion.      Cervical back: Neck supple.   Skin:     General: Skin is warm and dry.      Capillary Refill: Capillary refill takes less than 2 seconds.      Findings: No rash.   Neurological:      Mental Status: She is alert and oriented to person, place, and time.   Psychiatric:         Speech: Speech normal.         Behavior: Behavior normal.         Thought Content: Thought content normal.         Judgment: Judgment normal.         Procedure   Procedures       Assessment & Plan    Diagnosis Plan   1. Shortness of breath     2. Mixed hyperlipidemia       Plan:  1. The patient's echocardiogram findings were a preserved ejection fraction and grade 1 diastolic dysfunction.  No acute findings were found and we will continue to monitor at this time.    2.  She is still suffering from the affects of COVID-19 and following pulmonologist, Dr. Robert. I advised if she start experiencing worsening or new symptoms we can consider doing a repeat stress test.  3.  Patient's shortness of breath seems to be related to her COVID-19 is being followed by pulmonology.  No acute findings on the echocardiogram.  She seems to be doing relatively well from the cardiovascular standpoint we will continue to monitor at this time.  4.  Informed of signs and symptoms of ACS and advised to seek emergent treatment for any new worsening symptoms.  Patient also advised sooner follow-up as needed.  Also advised to follow-up with family doctor as needed  This note is dictated utilizing voice recognition software.  Although this record has been proof read, transcriptional errors may still be present. If questions occur regarding the content of this record please do not hesitate to call our office.  I have reviewed and confirmed the accuracy of the ROS as documented by the MA/ELYSSA/RN RODNEY Armenta    Assessment:  1. Shortness of breath  No follow-ups on file.    Diagnoses and all orders for this visit:    1. Shortness of breath  (Primary)    2. Mixed hyperlipidemia        Racquel Cheema  reports that she has never smoked. She has never used smokeless tobacco.. I have educated her on the risk of diseases from using tobacco products.       Advance Care Planning   ACP discussion was held with the patient during this visit. Patient has an advance directive (not in EMR), copy requested.     MEDS ORDERED DURING VISIT:  No orders of the defined types were placed in this encounter.    Transcribed from ambient dictation for RODNEY Armenta by Rabia Aguilar.  09/19/22   14:22 EDT    Patient verbalized consent to the visit recording.  I have personally performed the services described in this document as transcribed by the above individual, and it is both accurate and complete.  RODNEY Armenta  9/22/2022  08:54 EDT          This document has been electronically signed by RODNEY Armenta Jr.  September 22, 2022 08:53 EDT

## 2024-03-12 ENCOUNTER — TELEPHONE (OUTPATIENT)
Dept: CARDIOLOGY | Facility: CLINIC | Age: 79
End: 2024-03-12
Payer: MEDICARE

## 2024-03-12 RX ORDER — IRBESARTAN 75 MG/1
75 TABLET ORAL NIGHTLY
Qty: 90 TABLET | Refills: 3 | Status: SHIPPED | OUTPATIENT
Start: 2024-03-12

## 2024-03-12 NOTE — TELEPHONE ENCOUNTER
I seen Ms. Clement today in the office when she accompanied her .  She is reported that her blood pressure had been running high and her blood pressure today was 174/90 with a heart rate is 67 in the office.  She is not currently on any antihypertensive therapy.  We did discuss this in detail and she will be started on irbesartan 75 mg daily.  She was given a log to return in 2 weeks when she accompanies her  for his nurse visit.

## 2024-03-26 ENCOUNTER — CLINICAL SUPPORT (OUTPATIENT)
Dept: CARDIOLOGY | Facility: CLINIC | Age: 79
End: 2024-03-26
Payer: MEDICARE

## 2024-03-26 VITALS
HEART RATE: 74 BPM | WEIGHT: 146 LBS | HEIGHT: 63 IN | BODY MASS INDEX: 25.87 KG/M2 | SYSTOLIC BLOOD PRESSURE: 121 MMHG | OXYGEN SATURATION: 96 % | DIASTOLIC BLOOD PRESSURE: 67 MMHG

## 2024-03-26 DIAGNOSIS — I10 PRIMARY HYPERTENSION: Primary | ICD-10-CM

## 2024-03-26 NOTE — PROGRESS NOTES
Racquel Cheema  1945  3/26/2024   ?   Chief Complaint   Patient presents with    nurse visit      For blood pressure check       ?   HPI:   ? Patient presents today for blood pressure check .Patient blood pressure  121/67 heart rate 74.  ?   ?     Current Outpatient Medications:     Ascorbic Acid (VITAMIN C PO), Take 1,000 mg by mouth Daily., Disp: , Rfl:     irbesartan (Avapro) 75 MG tablet, Take 1 tablet by mouth Every Night., Disp: 90 tablet, Rfl: 3    pantoprazole (PROTONIX) 40 MG EC tablet, Take 40 mg by mouth Daily. FOR 30 DAYS, Disp: , Rfl: 5    vitamin B-12 (CYANOCOBALAMIN) 1000 MCG tablet, Take 1,000 mcg by mouth Daily., Disp: , Rfl:     Vitamin D, Cholecalciferol, (CHOLECALCIFEROL) 10 MCG (400 UNIT) tablet, Take 400 Units by mouth Daily., Disp: , Rfl:     zinc sulfate (ZINCATE) 220 (50 Zn) MG capsule, Take 220 mg by mouth Daily., Disp: , Rfl:    ?   ?   Demerol [meperidine]       Procedures     ?   Assessment & Plan    ? 1. Hypertension    Chart reviewed by Romero STEVENS. Patient is to continue the same with her blood pressure medication seeing that she is well controlled today. She will call in with any worsening symptoms or concerns. She will keep regular scheduled follow up apt.  ?   ?

## 2024-04-15 ENCOUNTER — TELEPHONE (OUTPATIENT)
Dept: CARDIOLOGY | Facility: CLINIC | Age: 79
End: 2024-04-15

## 2024-04-15 NOTE — TELEPHONE ENCOUNTER
Caller: Racquel Cheema    Relationship to patient: Self    Best call back number: [phone]    Chief complaint: FOLLOW UP (WAS BP ISSUES BUT PT STATES THESE ARE BETTER)    Type of visit: FOLLOW UP    Requested date: ANY     If rescheduling, when is the original appointment: 4.18.24     Additional notes:PATIENT HAS PNEUMONIA AND UNABLE TO MAKE APPOINTMENT. BLOOD PRESSURE IS DOING BETTER BUT WOULD LIKE TO R/S

## 2024-07-25 ENCOUNTER — OFFICE VISIT (OUTPATIENT)
Dept: CARDIOLOGY | Facility: CLINIC | Age: 79
End: 2024-07-25
Payer: MEDICARE

## 2024-07-25 VITALS
HEART RATE: 74 BPM | DIASTOLIC BLOOD PRESSURE: 81 MMHG | SYSTOLIC BLOOD PRESSURE: 145 MMHG | HEIGHT: 63 IN | WEIGHT: 148.2 LBS | OXYGEN SATURATION: 93 % | BODY MASS INDEX: 26.26 KG/M2

## 2024-07-25 DIAGNOSIS — R00.2 PALPITATIONS: ICD-10-CM

## 2024-07-25 DIAGNOSIS — R07.2 PRECORDIAL PAIN: Primary | ICD-10-CM

## 2024-07-25 DIAGNOSIS — R53.82 CHRONIC FATIGUE: ICD-10-CM

## 2024-07-25 DIAGNOSIS — R06.02 SHORTNESS OF BREATH: ICD-10-CM

## 2024-07-25 DIAGNOSIS — I10 PRIMARY HYPERTENSION: ICD-10-CM

## 2024-07-25 PROCEDURE — 1160F RVW MEDS BY RX/DR IN RCRD: CPT | Performed by: NURSE PRACTITIONER

## 2024-07-25 PROCEDURE — 99213 OFFICE O/P EST LOW 20 MIN: CPT | Performed by: NURSE PRACTITIONER

## 2024-07-25 PROCEDURE — 93000 ELECTROCARDIOGRAM COMPLETE: CPT | Performed by: NURSE PRACTITIONER

## 2024-07-25 PROCEDURE — 1159F MED LIST DOCD IN RCRD: CPT | Performed by: NURSE PRACTITIONER

## 2024-07-25 NOTE — PROGRESS NOTES
Subjective     Racquel Cheema is a 78 y.o. female who presents to day for Follow-up and Shortness of Breath.    CHIEF COMPLIANT  Chief Complaint   Patient presents with    Follow-up    Shortness of Breath       Active Problems:  Problem List Items Addressed This Visit          Cardiac and Vasculature    Chest pain - Primary    Palpitations       Pulmonary and Pneumonias    Shortness of breath       Symptoms and Signs    Chronic fatigue     Other Visit Diagnoses       Primary hypertension            PROBLEM LIST:      1. Hyperlipidemia  2. Chest Pain  2.1 9/19 negative stress test with preserved ejection fraction  2.2 echocardiogram 4/22: EF 60 to 65%, grade 1A diastolic dysfunction, trivial MR, trivial AI, physiological TR  3. Shortness of breath  4. Fatigue  5. Dizziness  6. Palpitations  7. Lung CA  8. Diabetes    HPI  HPI  Ms. Racquel Gramajo is a 78-year-old female patient who is being followed up today for chronic arterial hypertension.    Patient does have a history of chronic arterial hypertension in which she is being treated with irbesartan 75 mg nightly.  Today her blood pressure is elevated at 175/81 heart rate is 74.  Today her blood pressures is a little elevated however she says that she was in the 130s over 80s and but is higher than what it has been running.  She reports that she may be having issues with the irbesartan such as weight gain and swelling.  She also is concerned about her potassium.  She did bring up her labs on her phone and we did review her potassium which was recently checked and it was 4.6.  Her renal function was also within normal range.    Patient does report chest pain that occurs intermittently at random.  It occurs both with rest and activity.  She is describes as a sharp-like sensation that occurs maybe 3-4 times a month.  At times she does have associated nausea.  It mainly happens when she is up moving around.    Patient does have palpitations she has intermittent skipping  and fluttering like sensation that occasionally occurs in her chest.  She says it occurs every few days.  She says that she did have an irregular heartbeat after COVID but it did resolve.  She felt as if it may have been A-fib.    Patient also has lower extremity edema and which she says occurs on a daily basis lately.  She is mainly isolated to her feet.  She also has swelling around her eyes when she first wakes up in the morning.    Patient does have random episodes of dizziness that occurs quite frequently.  PRIOR MEDS  Current Outpatient Medications on File Prior to Visit   Medication Sig Dispense Refill    Ascorbic Acid (VITAMIN C PO) Take 1,000 mg by mouth Daily.      irbesartan (Avapro) 75 MG tablet Take 1 tablet by mouth Every Night. 90 tablet 3    pantoprazole (PROTONIX) 40 MG EC tablet Take 1 tablet by mouth Daily. FOR 30 DAYS  5    vitamin B-12 (CYANOCOBALAMIN) 1000 MCG tablet Take 1 tablet by mouth 1 (One) Time Per Week.      Vitamin D, Cholecalciferol, (CHOLECALCIFEROL) 10 MCG (400 UNIT) tablet Take 1 tablet by mouth 1 (One) Time Per Week.       No current facility-administered medications on file prior to visit.       ALLERGIES  Demerol [meperidine]    HISTORY  Past Medical History:   Diagnosis Date    Borderline diabetes     Cancer     lung cancer    Diverticulosis     Dizziness     Eczema     GERD (gastroesophageal reflux disease)     Hiatal hernia     Hyperlipidemia     IBS (irritable bowel syndrome)     Pneumonia due to COVID-19 virus 12/2021    Rosacea        Social History     Socioeconomic History    Marital status:    Tobacco Use    Smoking status: Never    Smokeless tobacco: Never   Substance and Sexual Activity    Alcohol use: No    Drug use: No       Family History   Problem Relation Age of Onset    Heart attack Mother     Alzheimer's disease Mother     Heart attack Father     Heart attack Brother        Review of Systems   Constitutional:  Negative for chills, fatigue and fever.  "  HENT:  Positive for rhinorrhea. Negative for congestion and sore throat.    Respiratory:  Negative for apnea, chest tightness and shortness of breath.    Cardiovascular:  Positive for chest pain (once in awhile) and leg swelling (feet  and eye lids). Negative for palpitations.   Gastrointestinal:  Negative for constipation, diarrhea and nausea.   Musculoskeletal:  Positive for arthralgias and neck pain. Negative for back pain.   Allergic/Immunologic: Positive for environmental allergies. Negative for food allergies.   Neurological:  Positive for dizziness (random) and light-headedness. Negative for syncope and weakness.   Hematological:  Bruises/bleeds easily.   Psychiatric/Behavioral:  Negative for sleep disturbance.        Objective     VITALS: /81 (BP Location: Right arm, Patient Position: Sitting, Cuff Size: Adult)   Pulse 74   Ht 160 cm (62.99\")   Wt 67.2 kg (148 lb 3.2 oz)   SpO2 93%   BMI 26.26 kg/m²     LABS:   Lab Results (most recent)       None            IMAGING:   No Images in the past 120 days found..    EXAM:  Physical Exam  Vitals and nursing note reviewed.   Constitutional:       Appearance: She is well-developed.   HENT:      Head: Normocephalic.   Neck:      Thyroid: No thyroid mass.      Vascular: No carotid bruit or JVD.      Trachea: Trachea and phonation normal.   Cardiovascular:      Rate and Rhythm: Normal rate and regular rhythm.      Pulses:           Radial pulses are 2+ on the right side and 2+ on the left side.        Posterior tibial pulses are 2+ on the right side and 2+ on the left side.      Heart sounds: Normal heart sounds. No murmur heard.     No friction rub. No gallop.   Pulmonary:      Effort: Pulmonary effort is normal. No respiratory distress.      Breath sounds: Normal breath sounds. No wheezing or rales.   Musculoskeletal:         General: No swelling. Normal range of motion.      Cervical back: Neck supple.   Skin:     General: Skin is warm and dry.      " Capillary Refill: Capillary refill takes less than 2 seconds.      Findings: No rash.   Neurological:      Mental Status: She is alert and oriented to person, place, and time.   Psychiatric:         Speech: Speech normal.         Behavior: Behavior normal.         Thought Content: Thought content normal.         Judgment: Judgment normal.         Procedure     ECG 12 Lead    Date/Time: 7/25/2024 2:28 PM  Performed by: Romero Trevino APRN    Authorized by: Romero Trevino APRN  Comparison: compared with previous ECG from 11/30/2021  Comparison to previous ECG: PACs  Rhythm: sinus rhythm  Ectopy: atrial premature contractions  Rate: normal  BPM: 71  QRS axis: normal  Comments: QTc 443 ms  No acute changes             Assessment & Plan    Diagnosis Plan   1. Precordial pain        2. Palpitations        3. Shortness of breath        4. Chronic fatigue        5. Primary hypertension        1.  Patient does have some atypical chest pain for angina.  We did discuss repeating ischemic workup.  However she wishes to defer at this time.  2.  Patient does have palpitations which she has frequent PACs on her EKG today.  We did discuss the etiology of PACs in detail.  Will continue with her current medication regimen without change.  If palpitations worsen we can repeat event monitor or have her start beta-blocker therapy.  3.  Patient's blood pressure is controlled on current blood pressure medication regimen despite elevated blood pressure today, we did discuss due to potential associated side effects that we can switch her antihypertensive medication to chlorthalidone.  However she wishes to defer at this time..  No medication changes are warranted at this time.  Patient advised to monitor blood pressure on a daily basis and report any persistent highs or lows.  Set goal blood pressure for patient at 130/80 or below.  4.  Informed of signs and symptoms of ACS and advised to seek emergent treatment for any new worsening  symptoms.  Patient also advised sooner follow-up as needed.  Also advised to follow-up with family doctor as needed  This note is dictated utilizing voice recognition software.  Although this record has been proof read, transcriptional errors may still be present. If questions occur regarding the content of this record please do not hesitate to call our office.  I have reviewed and confirmed the accuracy of the ROS as documented by the MA/LPN/RN RODNEY Armenta    Return if symptoms worsen or fail to improve, for Next scheduled follow up.    Diagnoses and all orders for this visit:    1. Precordial pain (Primary)    2. Palpitations    3. Shortness of breath    4. Chronic fatigue    5. Primary hypertension    Other orders  -     ECG 12 Lead        Racquel Cheema  reports that she has never smoked. She has never used smokeless tobacco. I have educated her on the risk of diseases from using tobacco products. Patient does not smoke.         BMI is >= 25 and <30. (Overweight) The following options were offered after discussion;: exercise counseling/recommendations and nutrition counseling/recommendations           MEDS ORDERED DURING VISIT:  No orders of the defined types were placed in this encounter.          This document has been electronically signed by RODNEY Armenta Jr.  July 26, 2024 09:49 EDT

## 2024-09-23 LAB
MAXIMAL PREDICTED HEART RATE: 150 BPM
STRESS TARGET HR: 128 BPM

## 2025-01-27 ENCOUNTER — OFFICE VISIT (OUTPATIENT)
Dept: CARDIOLOGY | Facility: CLINIC | Age: 80
End: 2025-01-27
Payer: MEDICARE

## 2025-01-27 VITALS
OXYGEN SATURATION: 94 % | HEIGHT: 63 IN | BODY MASS INDEX: 26.4 KG/M2 | DIASTOLIC BLOOD PRESSURE: 74 MMHG | SYSTOLIC BLOOD PRESSURE: 121 MMHG | HEART RATE: 79 BPM | WEIGHT: 149 LBS

## 2025-01-27 DIAGNOSIS — I10 PRIMARY HYPERTENSION: ICD-10-CM

## 2025-01-27 DIAGNOSIS — R06.02 SHORTNESS OF BREATH: ICD-10-CM

## 2025-01-27 DIAGNOSIS — R00.2 PALPITATIONS: ICD-10-CM

## 2025-01-27 DIAGNOSIS — R07.2 PRECORDIAL PAIN: Primary | ICD-10-CM

## 2025-01-27 PROCEDURE — 1160F RVW MEDS BY RX/DR IN RCRD: CPT | Performed by: NURSE PRACTITIONER

## 2025-01-27 PROCEDURE — 99213 OFFICE O/P EST LOW 20 MIN: CPT | Performed by: NURSE PRACTITIONER

## 2025-01-27 PROCEDURE — 1159F MED LIST DOCD IN RCRD: CPT | Performed by: NURSE PRACTITIONER

## 2025-01-27 RX ORDER — IRBESARTAN 75 MG/1
75 TABLET ORAL NIGHTLY
Qty: 90 TABLET | Refills: 3 | Status: SHIPPED | OUTPATIENT
Start: 2025-01-27

## 2025-01-27 NOTE — PROGRESS NOTES
Subjective     Racquel Cheema is a 79 y.o. female who presents to day for Follow-up, Hyperlipidemia, and Chest Pain (Patient has some occasional chest pain feels like it is GERD. Patient says that she can take 3 tums and it goes away).    CHIEF COMPLIANT  Chief Complaint   Patient presents with    Follow-up    Hyperlipidemia    Chest Pain     Patient has some occasional chest pain feels like it is GERD. Patient says that she can take 3 tums and it goes away       Active Problems:  Problem List Items Addressed This Visit          Cardiac and Vasculature    Chest pain - Primary    Palpitations       Pulmonary and Pneumonias    Shortness of breath     Other Visit Diagnoses       Primary hypertension        Relevant Medications    irbesartan (Avapro) 75 MG tablet        PROBLEM LIST:      1. Hyperlipidemia  2. Chest Pain  2.1 9/19 negative stress test with preserved ejection fraction  2.2 echocardiogram 4/22: EF 60 to 65%, grade 1A diastolic dysfunction, trivial MR, trivial AI, physiological TR  3. Shortness of breath  4. Fatigue  5. Dizziness  6. Palpitations  7. Lung CA  8. Diabetes    HPI  HPI  Ms. Racquel Gramajo is a 79-year-old female patient who is being followed up today for chronic arterial hypertension.     Patient does have a history of chronic arterial hypertension in which she is being treated with irbesartan 75 mg nightly.  Today her blood pressure is 121/74 heart rate of 79.    Patient does have atypical chest pain that occurs intermittently and at random.  Occurs both with rest and activity.  She describes as a sharp-like sensation.  She says it is usually relieved with 3 times.  She feels it is more associated with GERD.    Patient does have a history of intermittent palpitations in which she describes as a fluttering like sensation that occurs in her chest.  She says that this even seems to be improved.  She has had palpitations pretty much all of her life.    Patient denies any  shortness of breath,  lower extremity edema, fatigue, syncope, PND, orthopnea, or strokelike symptoms.  PRIOR MEDS  Current Outpatient Medications on File Prior to Visit   Medication Sig Dispense Refill    multivitamin with minerals (CENTRUM SILVER ULTRA WOMENS PO) Take 1 tablet by mouth Daily.      pantoprazole (PROTONIX) 40 MG EC tablet Take 1 tablet by mouth Daily. FOR 30 DAYS  5    [DISCONTINUED] irbesartan (Avapro) 75 MG tablet Take 1 tablet by mouth Every Night. 90 tablet 3    [DISCONTINUED] Ascorbic Acid (VITAMIN C PO) Take 1,000 mg by mouth Daily.      [DISCONTINUED] vitamin B-12 (CYANOCOBALAMIN) 1000 MCG tablet Take 1 tablet by mouth 1 (One) Time Per Week.      [DISCONTINUED] Vitamin D, Cholecalciferol, (CHOLECALCIFEROL) 10 MCG (400 UNIT) tablet Take 1 tablet by mouth 1 (One) Time Per Week.       No current facility-administered medications on file prior to visit.       ALLERGIES  Demerol [meperidine]    HISTORY  Past Medical History:   Diagnosis Date    Borderline diabetes     Cancer     lung cancer    Diverticulosis     Dizziness     Eczema     GERD (gastroesophageal reflux disease)     Hiatal hernia     Hyperlipidemia     IBS (irritable bowel syndrome)     Pneumonia due to COVID-19 virus 12/2021    Rosacea        Social History     Socioeconomic History    Marital status:    Tobacco Use    Smoking status: Never    Smokeless tobacco: Never   Substance and Sexual Activity    Alcohol use: No    Drug use: No       Family History   Problem Relation Age of Onset    Heart attack Mother     Alzheimer's disease Mother     Heart attack Father     Heart attack Brother        Review of Systems   Constitutional:  Negative for chills, fatigue and fever.   HENT:  Positive for postnasal drip. Negative for congestion, rhinorrhea and sore throat.    Eyes:  Negative for visual disturbance.   Respiratory:  Negative for apnea, chest tightness and shortness of breath.    Cardiovascular:  Positive for chest pain (occasional is relieved with  "tums) and palpitations (flutters). Negative for leg swelling.   Gastrointestinal:  Negative for constipation, diarrhea and nausea.   Musculoskeletal:  Positive for arthralgias and neck pain. Negative for back pain.   Skin:  Negative for rash and wound.   Allergic/Immunologic: Positive for environmental allergies. Negative for food allergies.   Neurological:  Negative for dizziness, syncope, weakness and light-headedness.   Hematological:  Bruises/bleeds easily.   Psychiatric/Behavioral:  Negative for sleep disturbance.        Objective     VITALS: /74 (BP Location: Right arm, Patient Position: Sitting, Cuff Size: Adult)   Pulse 79   Ht 160 cm (62.99\")   Wt 67.6 kg (149 lb)   SpO2 94%   BMI 26.40 kg/m²     LABS:   Lab Results (most recent)       None            IMAGING:   No Images in the past 120 days found..    EXAM:  Physical Exam  Vitals and nursing note reviewed.   Constitutional:       Appearance: She is well-developed.   HENT:      Head: Normocephalic.   Neck:      Thyroid: No thyroid mass.      Vascular: No carotid bruit or JVD.      Trachea: Trachea and phonation normal.   Cardiovascular:      Rate and Rhythm: Normal rate and regular rhythm.      Pulses:           Radial pulses are 2+ on the right side and 2+ on the left side.        Posterior tibial pulses are 2+ on the right side and 2+ on the left side.      Heart sounds: Normal heart sounds. No murmur heard.     No friction rub. No gallop.   Pulmonary:      Effort: Pulmonary effort is normal. No respiratory distress.      Breath sounds: Normal breath sounds. No wheezing or rales.   Musculoskeletal:         General: No swelling. Normal range of motion.      Cervical back: Neck supple.   Skin:     General: Skin is warm and dry.      Capillary Refill: Capillary refill takes less than 2 seconds.      Findings: No rash.   Neurological:      Mental Status: She is alert and oriented to person, place, and time.   Psychiatric:         Speech: Speech " normal.         Behavior: Behavior normal.         Thought Content: Thought content normal.         Judgment: Judgment normal.         Procedure   Procedures       Assessment & Plan    Diagnosis Plan   1. Precordial pain        2. Palpitations        3. Shortness of breath        4. Primary hypertension  irbesartan (Avapro) 75 MG tablet      1.  Patient's chest pain is atypical in nature and more associated with GERD per her report.  She feels like she is doing well from the cardiovascular standpoint.  Will continue to monitor at this time.    2.  Patient's blood pressure is controlled on current blood pressure medication regimen.  No medication changes are warranted at this time.  Patient advised to monitor blood pressure on a daily basis and report any persistent highs or lows.  Set goal blood pressure for patient at 130/80 or below.    3.  Informed of signs and symptoms of ACS and advised to seek emergent treatment for any new worsening symptoms.  Patient also advised sooner follow-up as needed.  Also advised to follow-up with family doctor as needed  This note is dictated utilizing voice recognition software.  Although this record has been proof read, transcriptional errors may still be present. If questions occur regarding the content of this record please do not hesitate to call our office.  I have reviewed and confirmed the accuracy of the ROS as documented by the MA/LPN/RN RODNEY Armenta    Return in about 6 months (around 7/27/2025), or if symptoms worsen or fail to improve.    Diagnoses and all orders for this visit:    1. Precordial pain (Primary)    2. Palpitations    3. Shortness of breath    4. Primary hypertension  -     irbesartan (Avapro) 75 MG tablet; Take 1 tablet by mouth Every Night.  Dispense: 90 tablet; Refill: 3        Racquel Cheema  reports that she has never smoked. She has never used smokeless tobacco. I have educated her on the risk of diseases from using tobacco products. Patient  does not smoke.    Advance Care Planning   ACP discussion was held with the patient during this visit. Patient has an advance directive (not in EMR), copy requested.                         MEDS ORDERED DURING VISIT:  New Medications Ordered This Visit   Medications    irbesartan (Avapro) 75 MG tablet     Sig: Take 1 tablet by mouth Every Night.     Dispense:  90 tablet     Refill:  3           This document has been electronically signed by Romero Trevino Jr., APRN  January 27, 2025 14:31 EST

## 2025-07-28 ENCOUNTER — OFFICE VISIT (OUTPATIENT)
Dept: CARDIOLOGY | Facility: CLINIC | Age: 80
End: 2025-07-28
Payer: MEDICARE

## 2025-07-28 VITALS
DIASTOLIC BLOOD PRESSURE: 77 MMHG | SYSTOLIC BLOOD PRESSURE: 121 MMHG | OXYGEN SATURATION: 98 % | HEART RATE: 61 BPM | WEIGHT: 137.6 LBS | BODY MASS INDEX: 24.38 KG/M2 | HEIGHT: 63 IN

## 2025-07-28 DIAGNOSIS — R00.2 PALPITATIONS: ICD-10-CM

## 2025-07-28 DIAGNOSIS — R53.82 CHRONIC FATIGUE: ICD-10-CM

## 2025-07-28 DIAGNOSIS — R07.2 PRECORDIAL PAIN: Primary | ICD-10-CM

## 2025-07-28 DIAGNOSIS — I10 PRIMARY HYPERTENSION: ICD-10-CM

## 2025-07-28 DIAGNOSIS — I73.9 CLAUDICATION: ICD-10-CM

## 2025-07-28 PROCEDURE — 99214 OFFICE O/P EST MOD 30 MIN: CPT | Performed by: NURSE PRACTITIONER

## 2025-07-28 PROCEDURE — 1160F RVW MEDS BY RX/DR IN RCRD: CPT | Performed by: NURSE PRACTITIONER

## 2025-07-28 PROCEDURE — 1159F MED LIST DOCD IN RCRD: CPT | Performed by: NURSE PRACTITIONER

## 2025-07-28 RX ORDER — NITROGLYCERIN 0.4 MG/1
TABLET SUBLINGUAL
Qty: 30 TABLET | Refills: 5 | Status: SHIPPED | OUTPATIENT
Start: 2025-07-28

## 2025-07-28 NOTE — PROGRESS NOTES
Subjective     Racquel Cheema is a 79 y.o. female who presents to day for Follow-up and Chest Pain (Has resolved from last visit  / she feels the pain that she is getting is GERD.).    CHIEF COMPLIANT  Chief Complaint   Patient presents with    Follow-up    Chest Pain     Has resolved from last visit  / she feels the pain that she is getting is GERD.       Active Problems:  Problem List Items Addressed This Visit          Cardiac and Vasculature    Chest pain - Primary    Relevant Orders    Stress Test With Myocardial Perfusion One Day    Palpitations    Relevant Orders    Stress Test With Myocardial Perfusion One Day       Symptoms and Signs    Chronic fatigue    Relevant Orders    Stress Test With Myocardial Perfusion One Day     Other Visit Diagnoses         Primary hypertension        Relevant Orders    Stress Test With Myocardial Perfusion One Day          1. Hyperlipidemia  2. Chest Pain  2.1 9/19 negative stress test with preserved ejection fraction  2.2 echocardiogram 4/22: EF 60 to 65%, grade 1A diastolic dysfunction, trivial MR, trivial AI, physiological TR  3. Shortness of breath  4. Fatigue  5. Dizziness  6. Palpitations  7. Lung CA  8. Diabetes    HPI  HPI  Ms. Racquel Gramajo is a 79-year-old female patient who is being followed up today for chronic arterial hypertension.     Patient does have a history of chronic arterial hypertension in which she is being treated with irbesartan 75 mg nightly.  Today her blood pressure is 121/77 heart rate of 61.    Patient does report chest pain that occurs intermittently in her chest that can occur couple times a day.  Usually associated with after eating.  She describes it as a tightness like sensation.  She really feels that it is associated with GERD.  These pains can last up to about an hour.  She denies any associated symptoms.    Patient does have palpitations she has intermittent fluttering and flopping like sensation that occurs in her chest.  This is  unchanged nonprogressive.  Says that she has had it all for a while.  It rarely occurs.    Patient also points out a prominent vein that runs up the anterior aspect of her right shin.  She is concerned about claudication-like symptoms and blood flow due to this.  Did discuss that this is be more likely a venous insufficiency type issue versus ischemic.  She does report some swelling that occurs in her feet that intermittently.    Patient denies any shortness of breath, palpitations, fatigue, syncope, PND, orthopnea, or strokelike symptoms.  PRIOR MEDS  Current Outpatient Medications on File Prior to Visit   Medication Sig Dispense Refill    irbesartan (Avapro) 75 MG tablet Take 1 tablet by mouth Every Night. 90 tablet 3    multivitamin with minerals (CENTRUM SILVER ULTRA WOMENS PO) Take 1 tablet by mouth Daily.      NON FORMULARY MCT OIL      [DISCONTINUED] pantoprazole (PROTONIX) 40 MG EC tablet Take 1 tablet by mouth Daily. FOR 30 DAYS  5     No current facility-administered medications on file prior to visit.       ALLERGIES  Demerol [meperidine]    HISTORY  Past Medical History:   Diagnosis Date    Borderline diabetes     Cancer     lung cancer    Diverticulosis     Dizziness     Eczema     GERD (gastroesophageal reflux disease)     Hiatal hernia     Hyperlipidemia     IBS (irritable bowel syndrome)     Pneumonia due to COVID-19 virus 12/2021    Rosacea        Social History     Socioeconomic History    Marital status:    Tobacco Use    Smoking status: Never    Smokeless tobacco: Never   Vaping Use    Vaping status: Never Used   Substance and Sexual Activity    Alcohol use: No    Drug use: No    Sexual activity: Defer       Family History   Problem Relation Age of Onset    Heart attack Mother     Alzheimer's disease Mother     Heart attack Father     Heart attack Brother        Review of Systems   Constitutional:  Negative for fatigue.   Respiratory:  Negative for apnea, chest tightness and shortness of  "breath.    Cardiovascular:  Positive for palpitations (flutters) and leg swelling (FEET SWELL SOMETIMES). Negative for chest pain.   Gastrointestinal:  Negative for constipation, diarrhea and nausea.   Neurological:  Positive for dizziness (is she does not drink enough) and light-headedness. Negative for syncope and weakness.   Hematological:  Bruises/bleeds easily.   Psychiatric/Behavioral:  Negative for sleep disturbance.        Objective     VITALS: /77 (BP Location: Left arm, Patient Position: Sitting, Cuff Size: Adult)   Pulse 61   Ht 160 cm (62.99\")   Wt 62.4 kg (137 lb 9.6 oz)   SpO2 98%   BMI 24.38 kg/m²     LABS:   Lab Results (most recent)       None            IMAGING:   No Images in the past 120 days found..    EXAM:  Physical Exam  Vitals and nursing note reviewed.   Constitutional:       Appearance: She is well-developed.   HENT:      Head: Normocephalic.   Neck:      Thyroid: No thyroid mass.      Vascular: No carotid bruit or JVD.      Trachea: Trachea and phonation normal.   Cardiovascular:      Rate and Rhythm: Normal rate and regular rhythm.      Pulses:           Radial pulses are 2+ on the right side and 2+ on the left side.        Posterior tibial pulses are 2+ on the right side and 2+ on the left side.      Heart sounds: Normal heart sounds. No murmur heard.     No friction rub. No gallop.   Pulmonary:      Effort: Pulmonary effort is normal. No respiratory distress.      Breath sounds: Normal breath sounds. No wheezing or rales.   Musculoskeletal:         General: No swelling. Normal range of motion.      Cervical back: Neck supple.   Skin:     General: Skin is warm and dry.      Capillary Refill: Capillary refill takes less than 2 seconds.      Findings: No rash.   Neurological:      Mental Status: She is alert and oriented to person, place, and time.   Psychiatric:         Speech: Speech normal.         Behavior: Behavior normal.         Thought Content: Thought content normal.   "       Judgment: Judgment normal.         Procedure   Procedures       Assessment & Plan    Diagnosis Plan   1. Precordial pain  Stress Test With Myocardial Perfusion One Day      2. Palpitations  Stress Test With Myocardial Perfusion One Day      3. Primary hypertension  Stress Test With Myocardial Perfusion One Day      4. Chronic fatigue  Stress Test With Myocardial Perfusion One Day      1.  Due to patient's atypical chest pain, comorbidities of hyperlipidemia, hypertension and borderline diabetes mellitus I would like for her to go under further evaluation of ischemia including an stress test.  2.  Patient's blood pressure is controlled on current blood pressure medication regimen.  No medication changes are warranted at this time.  Patient advised to monitor blood pressure on a daily basis and report any persistent highs or lows.  Set goal blood pressure for patient at 130/80 or below.  3.  Due to patient's concerns for peripheral vascular disease we will move forth with ABIs.  4.  Informed of signs and symptoms of ACS and advised to seek emergent treatment for any new worsening symptoms.  Patient also advised sooner follow-up as needed.  Also advised to follow-up with family doctor as needed  This note is dictated utilizing voice recognition software.  Although this record has been proof read, transcriptional errors may still be present. If questions occur regarding the content of this record please do not hesitate to call our office.  I have reviewed and confirmed the accuracy of the ROS as documented by the MA/LPN/RN RODNEY Armenta    Return if symptoms worsen or fail to improve, for Next scheduled follow up.    Diagnoses and all orders for this visit:    1. Precordial pain (Primary)  -     Stress Test With Myocardial Perfusion One Day; Future    2. Palpitations  -     Stress Test With Myocardial Perfusion One Day; Future    3. Primary hypertension  -     Stress Test With Myocardial Perfusion One Day;  Future    4. Chronic fatigue  -     Stress Test With Myocardial Perfusion One Day; Future    Other orders  -     nitroglycerin (NITROSTAT) 0.4 MG SL tablet; 1 under the tongue as needed for angina, may repeat q5mins for up three doses  Dispense: 30 tablet; Refill: 5        Racquel Cheema  reports that she has never smoked. She has never used smokeless tobacco. I have educated her on the risk of diseases from using tobacco products. Patient does not smoke.     Advance Care Planning   ACP discussion was held with the patient during this visit. Patient has POA and living will .          BMI is >= 25 and <30. (Overweight) The following options were offered after discussion;: exercise counseling/recommendations and nutrition counseling/recommendations           MEDS ORDERED DURING VISIT:  New Medications Ordered This Visit   Medications    nitroglycerin (NITROSTAT) 0.4 MG SL tablet     Si under the tongue as needed for angina, may repeat q5mins for up three doses     Dispense:  30 tablet     Refill:  5           This document has been electronically signed by Romero Trevino Jr., APRN  2025 12:53 EDT

## 2025-08-11 ENCOUNTER — RESULTS FOLLOW-UP (OUTPATIENT)
Dept: CARDIOLOGY | Facility: CLINIC | Age: 80
End: 2025-08-11
Payer: MEDICARE